# Patient Record
Sex: MALE | Race: WHITE | NOT HISPANIC OR LATINO | Employment: OTHER | URBAN - METROPOLITAN AREA
[De-identification: names, ages, dates, MRNs, and addresses within clinical notes are randomized per-mention and may not be internally consistent; named-entity substitution may affect disease eponyms.]

---

## 2017-04-20 ENCOUNTER — TELEPHONE (OUTPATIENT)
Dept: SPINE | Facility: CLINIC | Age: 22
End: 2017-04-20

## 2017-04-20 DIAGNOSIS — M41.9 SCOLIOSIS, UNSPECIFIED SCOLIOSIS TYPE, UNSPECIFIED SPINAL REGION: Primary | ICD-10-CM

## 2017-04-24 ENCOUNTER — TELEPHONE (OUTPATIENT)
Dept: SPINE | Facility: CLINIC | Age: 22
End: 2017-04-24

## 2017-04-28 ENCOUNTER — TELEPHONE (OUTPATIENT)
Dept: SPINE | Facility: CLINIC | Age: 22
End: 2017-04-28

## 2017-04-28 NOTE — TELEPHONE ENCOUNTER
"I have tried several times to reach patient to advise that his one year surgery follow up is due, but one phone number is disconnected and the other continuously says "the person you are trying to reach is not accepting cals at this time. I was able to find a family members number and asked that they please ask the patient to gave us a call, they took down the number but the call was not returned.  "

## 2017-05-15 ENCOUNTER — TELEPHONE (OUTPATIENT)
Dept: SPINE | Facility: CLINIC | Age: 22
End: 2017-05-15

## 2017-05-15 DIAGNOSIS — Z98.1 S/P SPINAL FUSION: Primary | ICD-10-CM

## 2017-05-16 ENCOUNTER — TELEPHONE (OUTPATIENT)
Dept: ORTHOPEDICS | Facility: CLINIC | Age: 22
End: 2017-05-16

## 2017-05-16 NOTE — TELEPHONE ENCOUNTER
Attempted to reach patient to advise that Dr. Rivera has added an xray prior to the appointment time that they discussed yesterday, but the phone says that the voice mail is not set up. Will try again tomorrow to reach patient.

## 2017-05-31 ENCOUNTER — TELEPHONE (OUTPATIENT)
Dept: ORTHOPEDICS | Facility: CLINIC | Age: 22
End: 2017-05-31

## 2017-05-31 NOTE — TELEPHONE ENCOUNTER
Certified letter mailed to patient to stress how important it is that he reschedule his 1 year surgery follow up. We have many numbers for patient but have a very hard time reaching him. Dr. Rivera reached him a couple of weeks ago and they set up the May 24th visit , but the patient did not make the visit.    TRACKING NUMBER :[phone] ([facility])

## 2017-10-27 ENCOUNTER — HOSPITAL ENCOUNTER (OUTPATIENT)
Dept: RADIOLOGY | Facility: HOSPITAL | Age: 22
Discharge: HOME | End: 2017-10-27
Attending: ORTHOPAEDIC SURGERY
Payer: MEDICARE

## 2017-10-27 ENCOUNTER — OFFICE VISIT (OUTPATIENT)
Dept: ORTHOPEDICS | Facility: CLINIC | Age: 22
End: 2017-10-27
Payer: MEDICARE

## 2017-10-27 VITALS — HEIGHT: 72 IN | BODY MASS INDEX: 27.09 KG/M2 | WEIGHT: 200 LBS

## 2017-10-27 DIAGNOSIS — M25.551 RIGHT HIP PAIN: ICD-10-CM

## 2017-10-27 DIAGNOSIS — R52 PAIN: Primary | ICD-10-CM

## 2017-10-27 DIAGNOSIS — G25.89 SCAPULAR DYSKINESIS: ICD-10-CM

## 2017-10-27 DIAGNOSIS — R52 PAIN: ICD-10-CM

## 2017-10-27 DIAGNOSIS — M41.40 NEUROMUSCULAR SCOLIOSIS, UNSPECIFIED SPINAL REGION: Primary | ICD-10-CM

## 2017-10-27 DIAGNOSIS — M41.9 SCOLIOSIS, UNSPECIFIED SCOLIOSIS TYPE, UNSPECIFIED SPINAL REGION: ICD-10-CM

## 2017-10-27 DIAGNOSIS — M41.9 SCOLIOSIS, UNSPECIFIED SCOLIOSIS TYPE, UNSPECIFIED SPINAL REGION: Primary | ICD-10-CM

## 2017-10-27 PROCEDURE — 99999 PR PBB SHADOW E&M-EST. PATIENT-LVL III: CPT | Mod: PBBFAC,,, | Performed by: ORTHOPAEDIC SURGERY

## 2017-10-27 PROCEDURE — 72082 X-RAY EXAM ENTIRE SPI 2/3 VW: CPT | Mod: 26,,, | Performed by: RADIOLOGY

## 2017-10-27 PROCEDURE — 73590 X-RAY EXAM OF LOWER LEG: CPT | Mod: 26,59,LT, | Performed by: RADIOLOGY

## 2017-10-27 PROCEDURE — 73590 X-RAY EXAM OF LOWER LEG: CPT | Mod: 50,TC,PO

## 2017-10-27 PROCEDURE — 73030 X-RAY EXAM OF SHOULDER: CPT | Mod: 26,LT,, | Performed by: RADIOLOGY

## 2017-10-27 PROCEDURE — 99213 OFFICE O/P EST LOW 20 MIN: CPT | Mod: PBBFAC,25,PO | Performed by: ORTHOPAEDIC SURGERY

## 2017-10-27 PROCEDURE — 73030 X-RAY EXAM OF SHOULDER: CPT | Mod: TC,PO,LT

## 2017-10-27 PROCEDURE — 72082 X-RAY EXAM ENTIRE SPI 2/3 VW: CPT | Mod: TC,PO

## 2017-10-27 PROCEDURE — 99214 OFFICE O/P EST MOD 30 MIN: CPT | Mod: S$PBB,,, | Performed by: ORTHOPAEDIC SURGERY

## 2017-10-27 PROCEDURE — 73590 X-RAY EXAM OF LOWER LEG: CPT | Mod: 26,RT,, | Performed by: RADIOLOGY

## 2017-10-27 NOTE — PROGRESS NOTES
sSubjective:      Patient ID: Daren Dubon is a 22 y.o. male.    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  Pt presents today for for evaluation of R hip pain.    Patient has also had R hip pain for a few months.  Denies injury.  Feels pain when moving and when sitting on it in the wrong manner.  Feels and hear popping.  9/10 severity.  No relieving factors.  No injury or obvious precipitating factors.    Review of patient's allergies indicates:  No Known Allergies    Past Medical History:   Diagnosis Date    Friedreich ataxia     Scoliosis      Past Surgical History:   Procedure Laterality Date    SCOLIOSIS REPAIR      wisdom teeth removal      age 18 years     Family History   Problem Relation Age of Onset    Hypertension Mother     No Known Problems Father     No Known Problems Brother     Heart disease Maternal Grandfather        Current Outpatient Prescriptions on File Prior to Visit   Medication Sig Dispense Refill    bismuth subsalicylate (PEPTO BISMOL) 262 mg/15 mL suspension Take by mouth as needed. Acid reflux      docusate sodium (COLACE) 50 MG capsule Take 2 capsules (100 mg total) by mouth 2 (two) times daily as needed. 30 capsule 0    hydrocodone-acetaminophen 5-325mg (NORCO) 5-325 mg per tablet Take 1 tablet by mouth every 4 to 6 hours as needed. 40 tablet 0    meloxicam (MOBIC) 7.5 MG tablet Take 1 tablet (7.5 mg total) by mouth once daily. 30 tablet 2    ondansetron (ZOFRAN-ODT) 8 MG TbDL Take 1 tablet (8 mg total) by mouth every 8 (eight) hours as needed. 12 tablet 2    sulfamethoxazole-trimethoprim 800-160mg (BACTRIM DS) 800-160 mg Tab        No current facility-administered medications on file prior to visit.        Social History     Social History Narrative    No narrative on file       ROS:  Constitutional: negative for  fevers  Eyes: no visual changes  ENT: negative for hearing loss  Respiratory: negative for dyspnea  Cardiovascular: negative for chest pain  Gastrointestinal: negative for abdominal pain  Genitourinary: negative for dysuria  Neurological: negative for headaches  Behavioral/Psych: negative for hallucinations  Endocrine: negative for temperature intolerance        Objective:        Vitals:    10/27/17 1015   Weight: 90.7 kg (200 lb)   Height: 6' (1.829 m)     AA&O x 4.  NAD  HEENT:  NCAT, sclera nonicteric  Lungs:  Respirations are equal and unlabored.  CV:  2+ bilateral upper and lower extremity pulses.  Skin:  Intact throughout.    MSK:  RLE:   Skin intact  No deformity  No swelling  TTP around iliac crest  No TTP GT  No pain with hip ROM  SILT L2-S1  Motor function at baseline  Brisk cap refill  Warm well perfused extremities  DP palpable and equal bilaterally      X-ray pelvis reveals broken L iliac screw        Assessment:       1. Neuromuscular scoliosis, unspecified spinal region    2. Scapular dyskinesis    3. Right hip pain           Plan:         1. CT lumbar and pelvis to assess for pseudoarthrosis.  F/u in scoli clinic 11/17/17.  Given rx for new WC at [facility].

## 2017-11-14 DIAGNOSIS — Z98.890 S/P SPINAL SURGERY: Primary | ICD-10-CM

## 2017-12-15 ENCOUNTER — OFFICE VISIT (OUTPATIENT)
Dept: ORTHOPEDICS | Facility: CLINIC | Age: 22
End: 2017-12-15
Payer: MEDICARE

## 2017-12-15 VITALS — HEIGHT: 71 IN

## 2017-12-15 DIAGNOSIS — M41.45 NEUROMUSCULAR SCOLIOSIS OF THORACOLUMBAR REGION: ICD-10-CM

## 2017-12-15 DIAGNOSIS — S32.009K PSEUDOARTHROSIS OF LUMBAR SPINE: Primary | ICD-10-CM

## 2017-12-15 DIAGNOSIS — G11.11 FRIEDREICH'S ATAXIA: ICD-10-CM

## 2017-12-15 PROCEDURE — 99214 OFFICE O/P EST MOD 30 MIN: CPT | Mod: S$PBB,,, | Performed by: ORTHOPAEDIC SURGERY

## 2017-12-15 PROCEDURE — 99999 PR PBB SHADOW E&M-EST. PATIENT-LVL III: CPT | Mod: PBBFAC,,,

## 2017-12-15 PROCEDURE — 99213 OFFICE O/P EST LOW 20 MIN: CPT | Mod: PBBFAC

## 2017-12-15 RX ORDER — TRAMADOL HYDROCHLORIDE 50 MG/1
50 TABLET ORAL NIGHTLY
COMMUNITY
End: 2017-12-15 | Stop reason: SDUPTHER

## 2017-12-15 RX ORDER — SODIUM CHLORIDE 9 MG/ML
INJECTION, SOLUTION INTRAVENOUS CONTINUOUS
Status: CANCELLED | OUTPATIENT
Start: 2017-12-15

## 2017-12-15 RX ORDER — MUPIROCIN 20 MG/G
OINTMENT TOPICAL
Status: CANCELLED | OUTPATIENT
Start: 2017-12-15

## 2017-12-15 RX ORDER — TRAMADOL HYDROCHLORIDE 50 MG/1
50 TABLET ORAL NIGHTLY
Qty: 48 TABLET | Refills: 0 | Status: SHIPPED | OUTPATIENT
Start: 2017-12-15 | End: 2018-02-01

## 2017-12-15 NOTE — PROGRESS NOTES
sSubjective:      Patient ID: Daren Dubon is a 22 y.o. male.    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  Pt presents today for for evaluation of R hip pain.    Patient has also had R hip pain for a few months.  Denies injury.  Feels pain when moving and when sitting on it in the wrong manner.  Feels and hears popping.  9/10 severity.  No relieving factors.  No injury or obvious precipitating factors.  Also occasional left hip pain and low back pain at night.    Review of patient's allergies indicates:  No Known Allergies    Past Medical History:   Diagnosis Date    Friedreich ataxia     Scoliosis      Past Surgical History:   Procedure Laterality Date    SCOLIOSIS REPAIR      wisdom teeth removal      age 18 years     Family History   Problem Relation Age of Onset    Hypertension Mother     No Known Problems Father     No Known Problems Brother     Heart disease Maternal Grandfather        Current Outpatient Prescriptions on File Prior to Visit   Medication Sig Dispense Refill    [DISCONTINUED] bismuth subsalicylate (PEPTO BISMOL) 262 mg/15 mL suspension Take by mouth as needed. Acid reflux      [DISCONTINUED] docusate sodium (COLACE) 50 MG capsule Take 2 capsules (100 mg total) by mouth 2 (two) times daily as needed. 30 capsule 0    [DISCONTINUED] hydrocodone-acetaminophen 5-325mg (NORCO) 5-325 mg per tablet Take 1 tablet by mouth every 4 to 6 hours as needed. 40 tablet 0    [DISCONTINUED] meloxicam (MOBIC) 7.5 MG tablet Take 1 tablet (7.5 mg total) by mouth once daily. 30 tablet 2    [DISCONTINUED] ondansetron (ZOFRAN-ODT) 8 MG TbDL Take 1 tablet (8 mg total) by mouth every 8 (eight) hours as needed. 12 tablet 2    [DISCONTINUED] sulfamethoxazole-trimethoprim 800-160mg (BACTRIM DS) 800-160 mg Tab        No current facility-administered medications on file prior to visit.   "      Social History     Social History Narrative    No narrative on file       ROS:  Constitutional: negative for fevers  Eyes: no visual changes  ENT: negative for hearing loss  Respiratory: negative for dyspnea  Cardiovascular: negative for chest pain  Gastrointestinal: negative for abdominal pain  Genitourinary: negative for dysuria  Neurological: negative for headaches  Behavioral/Psych: negative for hallucinations  Endocrine: negative for temperature intolerance        Objective:        Vitals:    12/15/17 0945   Height: 5' 11" (1.803 m)     AA&O x 4.  NAD  HEENT:  NCAT, sclera nonicteric  Lungs:  Respirations are equal and unlabored.  CV:  2+ bilateral upper and lower extremity pulses.  Skin:  Intact throughout.    MSK:  RLE:   Skin intact  No deformity  No swelling  TTP around iliac crest  No TTP GT  No pain with hip ROM  SILT L2-S1  Motor function at baseline  Brisk cap refill  Warm well perfused extremities  DP palpable and equal bilaterally      X-ray pelvis reveals broken L iliac screw  CT scan shows minimal healing of PSF.      Assessment:       1. Pseudoarthrosis of lumbar spine           Plan:         Recommend revision PSF with TLIF.  Counseled patient to quit smoking.  Will return for pre-op.  Surgery scheduled for 2/1.    "

## 2018-01-03 ENCOUNTER — ANESTHESIA EVENT (OUTPATIENT)
Dept: SURGERY | Facility: HOSPITAL | Age: 23
DRG: 457 | End: 2018-01-03
Payer: MEDICARE

## 2018-01-03 DIAGNOSIS — M79.606 PAIN OF LOWER EXTREMITY, UNSPECIFIED LATERALITY: Primary | ICD-10-CM

## 2018-01-03 NOTE — ANESTHESIA PREPROCEDURE EVALUATION
Anesthesia Assessment: Preoperative EQUATION    Planned Procedure: Procedure(s) (LRB):  FUSION-SPINAL L1-Pelvis Revision Co Case with Dr. Solo Ball Screws + T-PAL SNS: SSEP/EMG **AIRO Case** (N/A)  Requested Anesthesia Type:General  Surgeon: Mart Rivera MD  Service: Neurosurgery  Known or anticipated Date of Surgery:2/1/2018            Optimization:  Anesthesia Preop Clinic Assessment  Indicated    Medical Opinion Indicated DR ALANIZ      Plan:    Testing:  Hematology Profile, BMP, PT/INR, PTT, T&S and UA   Pre-anesthesia  visit       Visit focus: position other than supine     Consultation:IM Perioperative Hospitalist      Navigation: Tests Scheduled.              Consults scheduled.             Results will be tracked by Preop Clinic.                  LARRY COLON 1/3/18                                                                                                  01/03/2018  Chance WOJCIECH Dubon is a 22 y.o., male.    Anesthesia Evaluation    I have reviewed the Patient Summary Reports.    I have reviewed the Nursing Notes.   I have reviewed the Medications.     Review of Systems  Anesthesia Hx:  No problems with previous Anesthesia Denies Hx of Anesthetic complications  History of prior surgery of interest to airway management or planning: Previous anesthesia: General T4-pelvis posterior spinal fusion 4/12/16 with general anesthesia.  Procedure performed at an [facility]. Denies Family Hx of Anesthesia complications.   Denies Personal Hx of Anesthesia complications.   Social:  Former Smoker, No Alcohol Use Quit when he found out he had to have surgery; 1 ppd x 5 yrs Denies Alcohol Use.   Hematology/Oncology:  Hematology Normal   Oncology Normal     Cardiovascular:  Cardiovascular Normal Exercise tolerance: poor  Denies MI.   Wheelchair bound x 10 years. Denies CP, SOB Cardiomyopathy    Pulmonary:   Recent URI (cough last week), resolved Denies Sleep Apnea.    Renal/:  Renal/ Normal      Hepatic/GI:  Hepatic/GI Normal GERD, well controlled    Musculoskeletal:   Friedreich's ataxia - neuromuscular scoliosis Musculoskeletal General/Symptoms: muscle weakness, generalized. Functional capacity is wheelchair dependant.  Congenital/Developmental Disorder: Dx with Freidreich's  ataxia at age 10.   Spine Disorders: Scoliosis    Neurological:  Neurology Normal  Denies CVA. Denies Seizures.  Pain , onset is chronic , location of left hip , quality of aching/dull, burning, sharp , severity is a 10 , precipitating factors are movement    Endocrine:  Endocrine Normal    Dermatological:  Skin Normal    Psych:  Psychiatric Normal           Physical Exam  General:  Obesity    Airway/Jaw/Neck:  Airway Findings: Mouth Opening: Small, but > 3cm Tongue: Normal  General Airway Assessment: Adult  Jaw/Neck Findings:     Neck ROM: Normal ROM      Dental:  Dental Findings: In tact        Mental Status:  Mental Status Findings:  Cooperative, Alert and Oriented       Pt was seen in POC 1/22/18; Medical optimization: please see EPIC notes for recommendations of pre-op medical consultant, Dr Campbell, for perioperative medical management. Pt scheduled for ECHO 1/23/18./Ivette Mora RN            Anesthesia Plan  Type of Anesthesia, risks & benefits discussed:  Anesthesia Type:  MAC, general  Patient's Preference:   Intra-op Monitoring Plan:   Intra-op Monitoring Plan Comments:   Post Op Pain Control Plan: intrathecal opioid  Post Op Pain Control Plan Comments:   Induction:   IV  Beta Blocker:  Patient is not currently on a Beta-Blocker (No further documentation required).       Informed Consent: Patient understands risks and agrees with Anesthesia plan.  Questions answered. Anesthesia consent signed with patient representative.  ASA Score: 3     Day of Surgery Review of History & Physical:    H&P update referred to the surgeon.         Ready For Surgery From Anesthesia Perspective.

## 2018-01-03 NOTE — PRE ADMISSION SCREENING
Anesthesia Assessment: Preoperative EQUATION    Planned Procedure: Procedure(s) (LRB):  FUSION-SPINAL L1-Pelvis Revision Co Case with Dr. Solo Ball Screws + T-PAL SNS: SSEP/EMG **AIRO Case** (N/A)  Requested Anesthesia Type:General  Surgeon: Mart Rivera MD  Service: Neurosurgery  Known or anticipated Date of Surgery:2/1/2018            Optimization:  Anesthesia Preop Clinic Assessment  Indicated    Medical Opinion Indicated DR ALANIZ      Plan:    Testing:  Hematology Profile, BMP, PT/INR, PTT, T&S and UA   Pre-anesthesia  visit       Visit focus: position other than supine     Consultation:IM Perioperative Hospitalist      Navigation: Tests Scheduled.              Consults scheduled.             Results will be tracked by Preop Clinic.

## 2018-01-22 ENCOUNTER — HOSPITAL ENCOUNTER (OUTPATIENT)
Dept: PREADMISSION TESTING | Facility: HOSPITAL | Age: 23
Discharge: HOME | End: 2018-01-22
Attending: ANESTHESIOLOGY
Payer: MEDICARE

## 2018-01-22 ENCOUNTER — INITIAL CONSULT (OUTPATIENT)
Dept: INTERNAL MEDICINE | Facility: CLINIC | Age: 23
End: 2018-01-22
Payer: MEDICARE

## 2018-01-22 VITALS
TEMPERATURE: 100 F | DIASTOLIC BLOOD PRESSURE: 74 MMHG | SYSTOLIC BLOOD PRESSURE: 111 MMHG | OXYGEN SATURATION: 97 % | HEIGHT: 72 IN | HEART RATE: 83 BPM

## 2018-01-22 DIAGNOSIS — Z00.6 RESEARCH SUBJECT: Primary | ICD-10-CM

## 2018-01-22 DIAGNOSIS — R50.9 LOW GRADE FEVER: ICD-10-CM

## 2018-01-22 DIAGNOSIS — I51.89 DIASTOLIC DYSFUNCTION: ICD-10-CM

## 2018-01-22 DIAGNOSIS — R71.8 RBC MICROCYTOSIS: ICD-10-CM

## 2018-01-22 DIAGNOSIS — I42.9 CARDIOMYOPATHY, UNSPECIFIED TYPE: ICD-10-CM

## 2018-01-22 DIAGNOSIS — I27.20 PULMONARY HYPERTENSION: ICD-10-CM

## 2018-01-22 DIAGNOSIS — R94.31 ABNORMAL EKG: ICD-10-CM

## 2018-01-22 DIAGNOSIS — E66.9 CLASS 1 OBESITY WITH BODY MASS INDEX (BMI) OF 31.0 TO 31.9 IN ADULT, UNSPECIFIED OBESITY TYPE, UNSPECIFIED WHETHER SERIOUS COMORBIDITY PRESENT: ICD-10-CM

## 2018-01-22 DIAGNOSIS — L81.8 TATTOOS: ICD-10-CM

## 2018-01-22 DIAGNOSIS — R73.9 HYPERGLYCEMIA: ICD-10-CM

## 2018-01-22 PROCEDURE — 99213 OFFICE O/P EST LOW 20 MIN: CPT | Mod: PBBFAC,25 | Performed by: HOSPITALIST

## 2018-01-22 PROCEDURE — 99999 PR PBB SHADOW E&M-EST. PATIENT-LVL III: CPT | Mod: PBBFAC,,, | Performed by: HOSPITALIST

## 2018-01-22 PROCEDURE — 99214 OFFICE O/P EST MOD 30 MIN: CPT | Mod: S$PBB,,, | Performed by: HOSPITALIST

## 2018-01-22 NOTE — DISCHARGE INSTRUCTIONS
Your surgery has been scheduled for:__________________________________________    You should report to:  ____[facility], located on the [place] side of the first floor of the           [facility] ([phone])  ____[facility], located on the [facility] side of the            Second floor of the [facility] ([phone])  ____3rd Floor [facility] located on the [facility] side of the [facility] [phone]  Please Note   - Tell your doctor if you take Aspirin, products containing Aspirin, herbal medications  or blood thinners, such as Coumadin, Ticlid, or Plavix.  (Consult your provider regarding holding or stopping before surgery).  - Arrange for someone to drive you home following surgery.  You will not be allowed to leave the surgical facility alone or drive yourself home following sedation and anesthesia.  Before Surgery  - Stop taking all herbal medications 14days prior to surgery  - No Motrin/Advil (Ibuprofen) 7 days before surgery  - No Aleve (Naproxen) 7 days before surgery  - Stop Taking Asprin, products containing Asprin _____days before surgery  - Stop taking blood thinners_______days before surgery  - Refrain from drinking alcoholic beverages for 24hours before and after surgery  - Stop or limit smoking _________days before surgery  Night before Surgery  - DO NOT EAT OR DRINK ANYTHING AFTER MIDNIGHT, INCLUDING GUM, HARD CANDY, MINTS, OR CHEWING TOBACCO.  - Take a shower or bath (shower is recommended).  Bathe with Hibiclens soap or an antibacterial soap from the neck down.  If not supplied by your surgeon, hibiclens soap will need to be purchased over the counter in pharmacy.  Rinse soap off thoroughly.  - Shampoo your hair with your regular shampoo  The Day of Surgery  - Take another bath or shower with hibiclens or any antibacterial soap, to reduce the chance of infection.  - Take heart and blood  pressure medications with a small sip of water, as advised by the perioperative team.  - Do not take fluid pills  - You may brush your teeth and rinse your mouth, but do not swall any additional water.   - Do not apply perfumes, powder, body lotions or deodorant on the day of surgery.  - Nail polish should be removed.  - Do not wear makeup or moisturizer  - Wear comfortable clothes, such as a button front shirt and loose fitting pants.  - Leave all jewelry, including body piercings, and valuables at home.    - Bring any devices you will neeed after surgery such as crutches or canes.  - If you have sleep apnea, please bring your CPAP machine  In the event that your physical condition changes including the onset of a cold or respiratory illness, or if you have to delay or cancel your surgery, please notify your surgeon.Anesthesia: General Anesthesia  Youre due to have surgery. During surgery, youll be given medication called anesthesia. (It is also called anesthetic.) This will keep you comfortable and pain-free. Your anesthesia provider will use general anesthesia. This sheet tells you more about it.  What is general anesthesia?     You are watched continuously during your procedure by the anesthesia provider   General anesthesia puts you into a state like deep sleep. It goes into the bloodstream (IV anesthetics), into the lungs (gas anesthetics), or both. You feel nothing during the procedure. You will not remember it. During the procedure, the anesthesia provider monitors you continuously. He or she checks your heart rate and rhythm, blood pressure, breathing, and blood oxygen.  · IV Anesthetics. IV anesthetics are given through an IV line in your arm. Theyre often given first. This is so you are asleep before a gas anesthetic is started. Some kinds of IV anesthetics relieve pain. Others relax you. Your doctor will decide which kind is best in your case.  · Gas Anesthetics. Gas anesthetics are breathed into the  lungs. They are often used to keep you asleep. They can be given through a facemask or a tube placed in your larynx or trachea (breathing tube).  ? If you have a facemask, your anesthesia provider will most likely place it over your nose and mouth while youre still awake. Youll breathe oxygen through the mask as your IV anesthetic is started. Gas anesthetic may be added through the mask.  ? If you have a tube in the larynx or trachea, it will be inserted into your throat after youre asleep.  Anesthesia tools and medications  You will likely have:  · IV anesthetics. These are put into an IV line into your bloodstream.  · Gas anesthetics. You breathe these anesthetics into your lungs, where they pass into your bloodstream.  · Pulse oximeter. This is a small clip that is attached to the end of your finger. This measures your blood oxygen level.  · Electrocardiography leads (electrodes). These are small sticky pads that are placed on your chest. They record your heart rate and rhythm.  · Blood pressure cuff. This reads your blood pressure.  Risks and possible complications  General anesthesia has some risks. These include:  · Breathing problems  · Nausea and vomiting  · Sore throat or hoarseness (usually temporary)  · Allergic reaction to the anesthetic  · Irregular heartbeat (rare)  · Cardiac arrest (rare)   Anesthesia safety  · Follow all instructions you are given for how long not to eat or drink before your procedure.  · Be sure your doctor knows what medications and drugs you take. This includes over-the-counter medications, herbs, supplements, alcohol or other drugs. You will be asked when those were last taken.  · Have an adult family member or friend drive you home after the procedure.  · For the first 24 hours after your surgery:  ? Do not drive or use heavy equipment.  ? Have a trusted family member or spouse make important decisions or sign documents.  ? Avoid alcohol.  ? Have a responsible adult stay with  you. He or she can watch for problems and help keep you safe.  Date Last Reviewed: 10/16/2014  © 1440-9266 [employer]. [address] [place] [address], [place] [address]. All rights reserved. This information is not intended as a substitute for professional medical care. Always follow your healthcare professional's instructions.

## 2018-01-22 NOTE — LETTER
January 22, 2018      Rhonda G Leopold, MD  [address]  [place] [address]           [facility] - Pre Op Consult  [address] [facility] [address]  Phone: [phone]          Patient: Daren Dubon   MR Number: [id number]   YOB: 1995   Date of Visit: 1/22/2018       Dear Dr. Rhonda G Leopold:    Thank you for referring Daren Dubon to me for evaluation. Attached you will find relevant portions of my assessment and plan of care.    If you have questions, please do not hesitate to call me. I look forward to following Daren Dubon along with you.    Sincerely,    Theresa Campbell MD    Enclosure  CC:  Mart Rivera MD    If you would like to receive this communication electronically, please contact [email or web address] or [phone] to request more information on [employer] Video Link access.    For providers and/or their staff who would like to refer a patient to [facility], please contact us through our one-stop-shop provider referral line, [facility], at [phone].    If you feel you have received this communication in error or would no longer like to receive these types of communications, please e-mail [email or web address]

## 2018-01-22 NOTE — PROGRESS NOTES
[facility] - Pre Op Consult  Progress Note    Patient Name: Daren Dubon  MRN: [id number]  Date of Evaluation- 01/22/2018  PCP- Luis Calderón MD    Future cases for Daren Dubon [[id number]]     Case ID Status Date Time João Procedure Provider Location    [id number] [facility] 2/1/2018  7:00  FUSION-SPINAL L1-Pelvis Revision Co Case with Dr. Solo Jonesuy Screws + T-PAL SNS: SSEP/EMG **AIRO Case** Mart Rivera MD [[id number]] [facility] OR 2ND FLR          HPI:  History of present illness- I had the pleasure of meeting this pleasant 22 y.o. gentleman in the pre op clinic prior to his elective spine surgery. The patient is new to me . Daren JEFFREY was accompanied by mother Patricia, Step father Jerardo.    I have obtained the history by speaking to the patient and by reviewing the electronic health records.    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  Pt presents today for for evaluation of R hip pain   X-ray pelvis reveals broken L iliac screw   CT scan shows minimal healing of PSF   revision PSF with TLIF    He has been troubled with  severe  Rt hip  pain for pain. Pain increases with sitting for long time and activity and decreases with Tramadol.  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     Had a history of chest pain , nonce since his back was operated   Used to slump towards the ;left side    Had a stress test about 10  years ago- to the mother's understanding - un remarkable        Had a history of  enlarged heart?, not confirmed   Had cardiac check at [facility] end 2015 and was on  yearly checks   No heart failure history   His echo from 2008 reportedly showed mild concentric LVH with no LVOT obstruction   Possible mild diastolic dysfunction   Excellent systolic function   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   As per the record it can be associated with hypertrophic cardiomyopathy       Subjective/ Objective:          Chief complaint-Preoperative evaluation, Perioperative Medical management, complication reduction plan     Active cardiac conditions- none    Revised cardiac risk index predictors- none    Functional capacity -Examples of physical activity, confined to wheel chair, has strong upper body,  Cam move furniture with upper arms----- He can undertake all the above activities without  chest pain,chest tightness, Shortness of breath ,dizziness,lightheadedness making his exercise tolerance more, than 4 Mets.       Review of Systems   Constitutional: Negative for chills and fever.        Weight changes    Gained 30-40 pounds since 2016   Eating better since surgery   HENT:        MALINI 2/8    Neck size over 40 CM  Male gender   Eyes:        No unusual vision changes  Wears glasses   Respiratory:          Dry ,Cough   No hemoptysis   Cardiovascular:        As noted   Gastrointestinal:        Bowels- Regular  No overt GI/ blood losses   Endocrine:        Recent steroid use- none   Genitourinary: Negative for dysuria.        No urinary hesitancy   Musculoskeletal:        As above      Skin: Negative for rash.   Neurological: Negative for syncope.        No unilateral weakness   Hematological:        Current use of Anticoagulants/ Antiplatelet agent  None   Psychiatric/Behavioral:        Depression  Anxiety    None     No vascular stenting     No past medical history pertinent  negatives.    Past Surgical History:   Procedure Laterality Date    SCOLIOSIS REPAIR      wisdom teeth removal      age 18 years       No anesthesia, bleeding, cardiac problems , PONVwith previous surgeries/procedures.  Medications and Allergies reviewed in epic.   FH- No anesthesia, bleeding   in family   Maternal grand mother WPW  Lives with mother , step dad    Physical Exam   HENT:   Head: Normocephalic.       Physical Exam   HENT:   Head: Normocephalic.     Constitutional- Vitals - There is no height or weight on file to calculate BMI.,   Vitals:    01/22/18 1249   BP: 111/74   Pulse: 83   Temp: 99.8 °F (37.7 °C)     General appearance-Conscious,Coherent  Eyes- No conjunctival icterus,pupils  round  and reactive to light   ENT-Oral cavity- moist  , Hearing grossly normal   Neck- No thyromegaly ,Trachea -central, No jugular venous distension,   No Carotid Bruit   Cardiovascular -Heart Sounds- Normal  and  no murmur   , No gallop rhythm   Respiratory - Normal Respiratory Effort, Normal breath sounds,  no wheeze  and  no forced expiratory wheeze    Peripheral pitting pedal edema-- none , no calf pain   Gastrointestinal -Soft abdomen, No palpable masses, Non Tender,Liver,Spleen not palpable. No-- free fluid and shifting dullness  Musculoskeletal- No finger Clubbing. Strength 3/5 lower extremities   Lymphatic-No Palpable cervical, axillary,Inguinal lymphadenopathy   Psychiatric - normal effect,Orientation  Rt Dorsalis pedis pulses-palpable    Lt Dorsalis pedis pulses- palpable   Rt Posterior tibial pulses -palpable   Left posterior tibial pulses -palpable   Miscellaneous - tattoos,  no asterixis,  no dupuytren's contracture and  no renal bruit  Blood pressure 111/74, pulse 83, temperature 99.8 °F (37.7 °C), temperature source Oral, height 6' (1.829 m), SpO2 97 %.      Investigations  Lab and Imaging have been reviewed in [facility].    Review of Medicine tests    EKG- I had independently reviewed the EKG from--4/23/2016    It was reported to be showing     Sinus tachycardia  Possible RVH  Abnormal Q wave in lead III  T wave inversion in inferior leads  When compared with ECG of 20-APR-2016 12:42,  Vent. rate has increased BY  40 BPM  T wave inversion no longer evident in Lateral leads    2016       1 - Concentric remodeling.     2 - Normal left ventricular systolic function (EF 60-65%).     3 - Left ventricular diastolic dysfunction.     4 - The estimated PA systolic pressure is 40 mmHg.     5 - Mild to moderate pulmonic regurgitation.     6 - Normal right ventricular systolic function .     7 - Trivial tricuspid regurgitation.   Review of clinical lab tests-Date---1/22/2018  Creatinine-0.9   Date--1/22/2018 Hemoglobin--N  Platelet count--N      Review of old records- Was done and information gathered regards to events leading to surgery and health conditions of significance in the perioperative period.        Preoperative cardiac risk assessment-  The patient does not have any active cardiac conditions . Revised cardiac risk index predictors-0 ---.Functional capacity is more than 4 Mets. He will be undergoing a Spine procedure that carries a intermediate risk     The estimated risk of the rate of adverse cardiac outcomes  0.4%    No further cardiac work up is indicated prior to proceeding with the surgery     Orders Placed This Encounter    Hemoglobin A1c       American Society of Anesthesiologists Physical status classification ( ASA ) class: 3     Postoperative pulmonary complication risk assessment:        ARISCAT ( Canet) risk index- risk class -  Low    ACS NSQUIP risk - Serous complication risk - 4.5 % ( average risk is 2.9 %) , any complication 4.8 % ( Average is 3.1 %)       Assessment/Plan:     Abnormal EKG  No suggestion of of symptomatic CAD although reduced functional capacity  Confined to wheel chair   No longer having chest discomfort since spine surgery   2016- Global left ventricular systolic function appears  normal    Cardiomyopathy  No suggestion of Cardiac decompensation    Diastolic dysfunction  Diastolic Dysfunction:  I  suggest watching his fluid status perioperatively and to watch out for fluid overload in view of his Diastolic Dysfunction.  I suggest avoidance of the ordering of continuous IV fluids and if IV fluids are required ,to order for a specific duration of time and consider ordering lower IV fluid rate     RBC microcytosis  Normal Hb,HCT   No long standing NSAID use   Suggested follow up   No overt blood losses , Ulcer disease   No family history of thalassemia   Suggested follow up     Hyperglycemia  Mother reports increased thirst , Drinking water   will check A1c     Class 1 obesity with body mass index (BMI) of 31.0 to 31.9 in adult  Suggested weight loss    Pulmonary hypertension  2016- The estimated PA systolic pressure is 40 mmHg.    Low grade fever  will check urinalysis    Tattoos  No suggestion of liver decompensation          Preventive perioperative care    Thromboembolic prophylaxis:  His risk factors for thrombosis include obesity, surgical procedure and reduced mobility.I suggest  thromboembolic prophylaxis ( mechanical/pharmacological, weighing the risk benefits of pharmacological agent use considering mariajose procedural bleeding )  during the perioperative period.I suggested being active in the post operative period.      Postoperative pulmonary complication prophylaxis-Risk factors for post operative pulmonary complications include surgery lasting over 3 hours, ASA class >2 and functional dependence- I suggest incentive spirometry use, early ambulation, end tidal carbon dioxide monitoring and pain control so as to avoid diaphragmatic splinting  , oral care , head end of  Bed elevationm     Renal complication prophylaxis- I suggest keeping him well hydrated .I suggested drinking 2 litre's of water a day      Surgical site Infection Prophylaxis-I  suggest appropriate antibiotic for  Prophylaxis against Surgical site infections     In view of Spine procedure the patient  is at risk of postoperative urinary retention.  I suggest avoidance / minimizing the of  Benzodiazepines,Anticholinergic medication,antihistamines ( Benadryl) , if possible in the perioperative period. I suggest using the minimum possible use of opioids for the minimum period of time in the perioperative period. Benadryl avoidance suggested      This visit was focused on Preoperative evaluation, Perioperative Medical management, complication reduction plans. I suggest that the patient follows up with primary care or relevant sub specialists for ongoing health care.    I appreciate the opportunity to be involved in this patients care. Please feel free to contact me if there were any questions about this consultation.    Patient is pending optimization- A1c UA    Patient was instructed to call and update me about any changes to health, changes to medication, office visits , hospitalizations between now and surgery     Theresa Campbell MD  Perioperative Medicine  [facility]   Pager [phone]  -------    1/22- 18 36     Urinalysis not suggestive of UTI  ------    1/25- 7 58     A1c 5.5     Echo showed       1 - Normal left ventricular systolic function (EF 60-65%).     2 - Normal left ventricular diastolic function.     3 - Normal right ventricular systolic function .     4 - The estimated PA systolic pressure is 5 mmHg.     Echo showed no suggestion of Cardiomyopathy or Pulmonary HTN  Biventricular systolic function is normal   -----  1/26- 8 30     Called to discuss Echo,A1c   Spoke to mother   Suggested follow up about RBC Microcytosis- not on NSAID at this time   Was on Naproxen- for 2 months-none now  No family history of thalassemia, personal history of low Iron   Call, if needed

## 2018-01-22 NOTE — OUTPATIENT SUBJECTIVE & OBJECTIVE
Outpatient Subjective & Objective     Chief complaint-Preoperative evaluation, Perioperative Medical management, complication reduction plan     Active cardiac conditions- none    Revised cardiac risk index predictors- none    Functional capacity -Examples of physical activity, confined to wheel chair, has strong upper body,  Cam move furniture with upper arms----- He can undertake all the above activities without  chest pain,chest tightness, Shortness of breath ,dizziness,lightheadedness making his exercise tolerance more, than 4 Mets.       Review of Systems   Constitutional: Negative for chills and fever.        Weight changes    Gained 30-40 pounds since 2016   Eating better since surgery   HENT:        MALINI 2/8    Neck size over 40 CM  Male gender   Eyes:        No unusual vision changes  Wears glasses   Respiratory:          Dry ,Cough   No hemoptysis   Cardiovascular:        As noted   Gastrointestinal:        Bowels- Regular  No overt GI/ blood losses   Endocrine:        Recent steroid use- none   Genitourinary: Negative for dysuria.        No urinary hesitancy   Musculoskeletal:        As above      Skin: Negative for rash.   Neurological: Negative for syncope.        No unilateral weakness   Hematological:        Current use of Anticoagulants/ Antiplatelet agent  None   Psychiatric/Behavioral:        Depression  Anxiety    None     No vascular stenting     No past medical history pertinent negatives.    Past Surgical History:   Procedure Laterality Date    SCOLIOSIS REPAIR      wisdom teeth removal      age 18 years       No anesthesia, bleeding, cardiac problems , PONVwith previous surgeries/procedures.  Medications and Allergies reviewed in epic.   FH- No anesthesia, bleeding   in family   Maternal grand mother WPW  Lives with mother , step dad    Physical Exam   HENT:   Head: Normocephalic.       Physical Exam   HENT:   Head: Normocephalic.     Constitutional- Vitals - There is no height or  weight on file to calculate BMI.,   Vitals:    01/22/18 1249   BP: 111/74   Pulse: 83   Temp: 99.8 °F (37.7 °C)     General appearance-Conscious,Coherent  Eyes- No conjunctival icterus,pupils  round  and reactive to light   ENT-Oral cavity- moist  , Hearing grossly normal   Neck- No thyromegaly ,Trachea -central, No jugular venous distension,   No Carotid Bruit   Cardiovascular -Heart Sounds- Normal  and  no murmur   , No gallop rhythm   Respiratory - Normal Respiratory Effort, Normal breath sounds,  no wheeze  and  no forced expiratory wheeze    Peripheral pitting pedal edema-- none , no calf pain   Gastrointestinal -Soft abdomen, No palpable masses, Non Tender,Liver,Spleen not palpable. No-- free fluid and shifting dullness  Musculoskeletal- No finger Clubbing. Strength 3/5 lower extremities   Lymphatic-No Palpable cervical, axillary,Inguinal lymphadenopathy   Psychiatric - normal effect,Orientation  Rt Dorsalis pedis pulses-palpable    Lt Dorsalis pedis pulses- palpable   Rt Posterior tibial pulses -palpable   Left posterior tibial pulses -palpable   Miscellaneous - tattoos,  no asterixis,  no dupuytren's contracture and  no renal bruit  Blood pressure 111/74, pulse 83, temperature 99.8 °F (37.7 °C), temperature source Oral, height 6' (1.829 m), SpO2 97 %.      Investigations  Lab and Imaging have been reviewed in [facility].    Review of Medicine tests    EKG- I had independently reviewed the EKG from--4/23/2016   It was reported to be showing     Sinus tachycardia  Possible RVH  Abnormal Q wave in lead III  T wave inversion in inferior leads  When compared with ECG of 20-APR-2016 12:42,  Vent. rate has increased BY  40 BPM  T wave inversion no longer evident in Lateral leads    2016       1 - Concentric remodeling.     2 - Normal left ventricular systolic function (EF 60-65%).     3 - Left ventricular diastolic dysfunction.     4 - The estimated PA systolic pressure is 40 mmHg.     5 - Mild to moderate pulmonic  regurgitation.     6 - Normal right ventricular systolic function .     7 - Trivial tricuspid regurgitation.   Review of clinical lab tests-Date---1/22/2018  Creatinine-0.9   Date--1/22/2018 Hemoglobin--N  Platelet count--N      Review of old records- Was done and information gathered regards to events leading to surgery and health conditions of significance in the perioperative period.    Outpatient Subjective & Objective

## 2018-01-22 NOTE — ASSESSMENT & PLAN NOTE
Normal Hb,HCT   No long standing NSAID use   Suggested follow up   No overt blood losses , Ulcer disease   No family history of thalassemia   Suggested follow up

## 2018-01-22 NOTE — ASSESSMENT & PLAN NOTE
No suggestion of of symptomatic CAD although reduced functional capacity  Confined to wheel chair   No longer having chest discomfort since spine surgery   2016- Global left ventricular systolic function appears normal

## 2018-01-22 NOTE — HPI
History of present illness- I had the pleasure of meeting this pleasant 22 y.o. gentleman in the pre op clinic prior to his elective spine surgery. The patient is new to me . Daren JEFFREY was accompanied by mother Patricia, Step father Jerardo.    I have obtained the history by speaking to the patient and by reviewing the electronic health records.    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  Pt presents today for for evaluation of R hip pain   X-ray pelvis reveals broken L iliac screw   CT scan shows minimal healing of PSF   revision PSF with TLIF    He has been troubled with  severe  Rt hip  pain for pain. Pain increases with sitting for long time and activity and decreases with Tramadol.  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     Had a history of chest pain , nonce since his back was operated   Used to slump towards the ;left side   Had a stress test about 10  years ago- to the mother's understanding - un remarkable        Had a history of  enlarged heart?, not confirmed   Had cardiac check at [facility] end 2015 and was on  yearly checks   No heart failure history   His echo from 2008 reportedly showed mild concentric LVH with no LVOT obstruction   Possible mild diastolic dysfunction   Excellent systolic function   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

## 2018-01-22 NOTE — ASSESSMENT & PLAN NOTE
Diastolic Dysfunction:  I  suggest watching his fluid status perioperatively and to watch out for fluid overload in view of his Diastolic Dysfunction.  I suggest avoidance of the ordering of continuous IV fluids and if IV fluids are required ,to order for a specific duration of time and consider ordering lower IV fluid rate

## 2018-01-23 ENCOUNTER — RESEARCH ENCOUNTER (OUTPATIENT)
Dept: ORTHOPEDICS | Facility: CLINIC | Age: 23
End: 2018-01-23

## 2018-01-23 ENCOUNTER — HOSPITAL ENCOUNTER (OUTPATIENT)
Dept: CARDIOLOGY | Facility: HOSPITAL | Age: 23
Discharge: HOME | End: 2018-01-23
Attending: HOSPITALIST
Payer: MEDICARE

## 2018-01-23 VITALS
SYSTOLIC BLOOD PRESSURE: 147 MMHG | TEMPERATURE: 99 F | DIASTOLIC BLOOD PRESSURE: 89 MMHG | HEART RATE: 89 BPM | RESPIRATION RATE: 15 BRPM

## 2018-01-23 DIAGNOSIS — I42.9 CARDIOMYOPATHY, UNSPECIFIED TYPE: ICD-10-CM

## 2018-01-23 DIAGNOSIS — I27.20 PULMONARY HYPERTENSION: ICD-10-CM

## 2018-01-23 LAB
DIASTOLIC DYSFUNCTION: NO
ESTIMATED PA SYSTOLIC PRESSURE: 5.31
MITRAL VALVE MOBILITY: NORMAL
RETIRED EF AND QEF - SEE NOTES: 65 (ref 55–65)
TRICUSPID VALVE REGURGITATION: NORMAL

## 2018-01-23 PROCEDURE — 93306 TTE W/DOPPLER COMPLETE: CPT | Mod: 26,,, | Performed by: INTERNAL MEDICINE

## 2018-01-23 PROCEDURE — 93306 TTE W/DOPPLER COMPLETE: CPT

## 2018-01-23 NOTE — PROGRESS NOTES
[employer] Strive [id number]  IRB#[id number]  Subject: [id number]  PI: Martin Valadez  Visit 1    A PHASE 2b, RANDOMIZED, DOUBLE-BLIND, PLACEBO-CONTROLLED  STUDY TO EVALUATE THE SAFETY AND EFFICACY OF STAPHYLOCOCCUS  AUREUS 4-ANTIGEN VACCINE (SA4Ag) IN ADULTS UNDERGOING ELECTIVE  OPEN POSTERIOR SPINAL FUSION PROCEDURES WITH MULTILEVEL INSTRUMENTATION     Prior to the patients visit, Inclusion/Exclsuion criteria were confirmed by Rasheeda Partida and .   Research Visit occurred on January 22,2018. Encounter note created on Jan.23 by Kiko Campbell, not .       This patient was evaluated for the [employer] Strive [id number] Research Study and was found to meet all inclusion and no exclusion criteria based on the information known at the time by the ,Kiko Campbell. The patient was seen in a private area by myself and Leslee Rosado.   The informed consent was explained to the patient by , Kiko Campbell. Prior to the informed consent being signed or any protocol required testing performed, the patient was fully explained the experimental nature of the research using the simple language outlined in the informed consent. The consent is in the preferred language of the potential subject.  The informed consent document was used to explain the purpose of the study, qualifications to participate, study design, schedule, procedures, risks, benefits, alternative treatments, confidentiality, HIPAA authorization, clinicaltrials.gov, compensation and costs.  ZAYDA Ríos, was available to address any medical questions. Patient was provided the informed consent prior to this visit. I called the patient and spoke with his mother, Patricia to go over the informed consent before their study visit and answer any questions. Time was allowed for verbalizing any questions or concerns by the patient and all questions were answered to the patient's satisfaction. The patient has difficuly  speaking, so I asked open ended questions to confirm his comprehension.  His mother, Patricia acted as his Legal Authorized Representative during the visit. She also confirmed with Chance his understanding of the clinical trial. His mother had no questions and verbally confirmed she understood the clinical trial as well.The patients replies were audible and confirmed his comprehension of the study/procedures.No coercion or undue influence was used. The voluntary participation in this clinical trial and the ability to withdraw from the trial at any time was discussed multiple times during the informed consent and repeated through out the visit.     The correct version of the consent, Version Date: [id number], was signed and all required signatures were obtained. The person obtaining consent reviewed the consent for completeness. The patient verbally consented, and his Legal Authorized Representative and mother, Patricia signed the informed consent.A copy was given to the patient and his mother. A copy of the consent was placed with the subjects electronic medical records and the original was placed in the subjects binder. All study related procedures took place after the patient signed the informed consent.    Visit 1: After the consent was signed, patient demographics were recorded by the . The patient was asked to confirm their current medications. The patients smoking status and alcohol use were recorded. The patient was then asked about their medical and surgical history and the information was recorded. The patients height, weight, temperature, respiratory rate, blood pressure and pulse were taken. A urine pregnancy test was not given due to gender. Rasheeda Partida performed a physical exam and Nicolás comorbidity score. The criteria for delaying the vaccine was reviewed by Kiko Campbell. A total of 24ml of blood was drawn by phlebotomy and processed by Kiko Campbell. Nasal and throat  swabs were collected by the Kiko Campbell. After the subject met all study criteria, the subject was randomized and given the vaccine injection into the left deltoid muscle. The patient was observed for 30 minutes after the vaccine administration. The patient did not experience any post injection reactions after being observed for 30 minutes by Kiko Campbell. The patient was issued study materials and instructed on their use. The patient was reminded about emergency contacts and important signs and symptoms to monitor until surgery.      has reviewed and agreed to all of the above information.     Addendum: Patients agrees to use a condom with spermicide as method of birth control for the trial.

## 2018-01-23 NOTE — PRE-PROCEDURE INSTRUCTIONS
Pt was seen in the pre-op center today accompanied by mother. Pre-op instructions given including NPO after MN, medications to take/hold the morning of surgery, arrival procedure and location, shower with antibacterial soap; anesthesia type discussed and pre-op assessment completed. Pts questions answered and concerns addressed. Pt verbalized understanding.

## 2018-01-29 ENCOUNTER — DOCUMENTATION ONLY (OUTPATIENT)
Dept: ORTHOPEDICS | Facility: CLINIC | Age: 23
End: 2018-01-29

## 2018-01-29 NOTE — PROGRESS NOTES
Research Encounter    Nova [id number]    IRB# [id number]    Subject: [id number]    Date: 1/22/2018    Visit: 1 , Study Enrollment and Day of Vaccination    I, Rasheeda Partida, have seen the patient, Daren Dubon, today, 1/22/2018, in regards to the Pfizer [place] Tablenhan [id number] study. Before the visit today, I have reviewed the patients medical records to evaluate his preliminary eligibility and suitability for the trial in conjunction with the research coordinator, Kiko Campbell. All eligibility criteria were reviewed for final sign-off by sub-investigator, Dr. Rivera. Before the informed consent was signed, I spoke with the patient and his mother, Patricia, to answer any medical questions they had about the trial. They verbalized they had no questions and wished to proceed with the trial.    After the informed consent was signed, I performed a physical examination and [place]Minn Comorbidity Score. The patient continued with the rest of his study visit, which involved a blood draw by phlebotomy and nasal and throat swabs performed by the research coordinator. As the last part of the study visit, I administered the Investigational Vaccine to the patients left deltoid. The research coordinator then observed the patient for 30 minutes post injection and instructed them on follow up procedures for injection site monitoring.

## 2018-01-31 DIAGNOSIS — S32.009K PSEUDOARTHROSIS OF LUMBAR SPINE: Primary | ICD-10-CM

## 2018-02-01 ENCOUNTER — HOSPITAL ENCOUNTER (INPATIENT)
Facility: HOSPITAL | Age: 23
LOS: 5 days | Discharge: HOME | DRG: 457 | End: 2018-02-06
Attending: ORTHOPAEDIC SURGERY | Admitting: ORTHOPAEDIC SURGERY
Payer: MEDICARE

## 2018-02-01 ENCOUNTER — RESEARCH ENCOUNTER (OUTPATIENT)
Dept: RESEARCH | Facility: HOSPITAL | Age: 23
End: 2018-02-01

## 2018-02-01 ENCOUNTER — ANESTHESIA (OUTPATIENT)
Dept: SURGERY | Facility: HOSPITAL | Age: 23
DRG: 457 | End: 2018-02-01
Payer: MEDICARE

## 2018-02-01 DIAGNOSIS — M41.45 NEUROMUSCULAR SCOLIOSIS OF THORACOLUMBAR REGION: Primary | ICD-10-CM

## 2018-02-01 DIAGNOSIS — S32.009K PSEUDOARTHROSIS OF LUMBAR SPINE: ICD-10-CM

## 2018-02-01 DIAGNOSIS — R52 PAIN: ICD-10-CM

## 2018-02-01 LAB
ABO + RH BLD: NORMAL
ANION GAP SERPL CALC-SCNC: 12 MMOL/L
BASOPHILS # BLD AUTO: 0.02 K/UL
BASOPHILS NFR BLD: 0.2 %
BLD GP AB SCN CELLS X3 SERPL QL: NORMAL
BUN SERPL-MCNC: 11 MG/DL
CALCIUM SERPL-MCNC: 8 MG/DL
CHLORIDE SERPL-SCNC: 108 MMOL/L
CO2 SERPL-SCNC: 19 MMOL/L
CREAT SERPL-MCNC: 1 MG/DL
DIFFERENTIAL METHOD: ABNORMAL
EOSINOPHIL # BLD AUTO: 0 K/UL
EOSINOPHIL NFR BLD: 0.1 %
ERYTHROCYTE [DISTWIDTH] IN BLOOD BY AUTOMATED COUNT: 12.9 %
EST. GFR  (AFRICAN AMERICAN): >60 ML/MIN/1.73 M^2
EST. GFR  (NON AFRICAN AMERICAN): >60 ML/MIN/1.73 M^2
GLUCOSE SERPL-MCNC: 122 MG/DL (ref 70–110)
GLUCOSE SERPL-MCNC: 169 MG/DL
HCO3 UR-SCNC: 23.5 MMOL/L (ref 24–28)
HCT VFR BLD AUTO: 39.1 %
HCT VFR BLD CALC: 35 %PCV (ref 36–54)
HGB BLD-MCNC: 13.2 G/DL
IMM GRANULOCYTES # BLD AUTO: 0.13 K/UL
IMM GRANULOCYTES NFR BLD AUTO: 1.1 %
LYMPHOCYTES # BLD AUTO: 0.7 K/UL
LYMPHOCYTES NFR BLD: 5.3 %
MCH RBC QN AUTO: 26.7 PG
MCHC RBC AUTO-ENTMCNC: 33.8 G/DL
MCV RBC AUTO: 79 FL
MONOCYTES # BLD AUTO: 0.4 K/UL
MONOCYTES NFR BLD: 2.9 %
NEUTROPHILS # BLD AUTO: 11.1 K/UL
NEUTROPHILS NFR BLD: 90.4 %
NRBC BLD-RTO: 0 /100 WBC
PCO2 BLDA: 38.8 MMHG (ref 35–45)
PH SMN: 7.39 [PH] (ref 7.35–7.45)
PLATELET # BLD AUTO: 233 K/UL
PMV BLD AUTO: 10.4 FL
PO2 BLDA: 144 MMHG (ref 80–100)
POC BE: -1 MMOL/L
POC IONIZED CALCIUM: 1.06 MMOL/L (ref 1.06–1.42)
POC SATURATED O2: 99 % (ref 95–100)
POC TCO2: 25 MMOL/L (ref 23–27)
POCT GLUCOSE: 94 MG/DL (ref 70–110)
POTASSIUM BLD-SCNC: 3.9 MMOL/L (ref 3.5–5.1)
POTASSIUM SERPL-SCNC: 4.3 MMOL/L
RBC # BLD AUTO: 4.95 M/UL
SAMPLE: ABNORMAL
SODIUM BLD-SCNC: 138 MMOL/L (ref 136–145)
SODIUM SERPL-SCNC: 139 MMOL/L
WBC # BLD AUTO: 12.25 K/UL

## 2018-02-01 PROCEDURE — 22849 REINSERT SPINAL FIXATION: CPT | Mod: ,,, | Performed by: ORTHOPAEDIC SURGERY

## 2018-02-01 PROCEDURE — 27201037 HC PRESSURE MONITORING SET UP

## 2018-02-01 PROCEDURE — 22633 ARTHRD CMBN 1NTRSPC LUMBAR: CPT | Mod: 62,,, | Performed by: ORTHOPAEDIC SURGERY

## 2018-02-01 PROCEDURE — 37000009 HC ANESTHESIA EA ADD 15 MINS: Performed by: ORTHOPAEDIC SURGERY

## 2018-02-01 PROCEDURE — 36620 INSERTION CATHETER ARTERY: CPT | Mod: 59,,, | Performed by: ANESTHESIOLOGY

## 2018-02-01 PROCEDURE — 80048 BASIC METABOLIC PNL TOTAL CA: CPT

## 2018-02-01 PROCEDURE — 86920 COMPATIBILITY TEST SPIN: CPT

## 2018-02-01 PROCEDURE — 36000711: Performed by: ORTHOPAEDIC SURGERY

## 2018-02-01 PROCEDURE — 25000003 PHARM REV CODE 250: Performed by: ANESTHESIOLOGY

## 2018-02-01 PROCEDURE — 63600175 PHARM REV CODE 636 W HCPCS: Performed by: ORTHOPAEDIC SURGERY

## 2018-02-01 PROCEDURE — 25000003 PHARM REV CODE 250: Performed by: ORTHOPAEDIC SURGERY

## 2018-02-01 PROCEDURE — 0SG3071 FUSION OF LUMBOSACRAL JOINT WITH AUTOLOGOUS TISSUE SUBSTITUTE, POSTERIOR APPROACH, POSTERIOR COLUMN, OPEN APPROACH: ICD-10-PCS | Performed by: ORTHOPAEDIC SURGERY

## 2018-02-01 PROCEDURE — 25000003 PHARM REV CODE 250: Performed by: STUDENT IN AN ORGANIZED HEALTH CARE EDUCATION/TRAINING PROGRAM

## 2018-02-01 PROCEDURE — 86901 BLOOD TYPING SEROLOGIC RH(D): CPT

## 2018-02-01 PROCEDURE — 63600175 PHARM REV CODE 636 W HCPCS: Performed by: STUDENT IN AN ORGANIZED HEALTH CARE EDUCATION/TRAINING PROGRAM

## 2018-02-01 PROCEDURE — 0ST20ZZ RESECTION OF LUMBAR VERTEBRAL DISC, OPEN APPROACH: ICD-10-PCS | Performed by: ORTHOPAEDIC SURGERY

## 2018-02-01 PROCEDURE — 36000710: Performed by: ORTHOPAEDIC SURGERY

## 2018-02-01 PROCEDURE — 88300 SURGICAL PATH GROSS: CPT | Mod: 26,,, | Performed by: PATHOLOGY

## 2018-02-01 PROCEDURE — 0QP004Z REMOVAL OF INTERNAL FIXATION DEVICE FROM LUMBAR VERTEBRA, OPEN APPROACH: ICD-10-PCS | Performed by: ORTHOPAEDIC SURGERY

## 2018-02-01 PROCEDURE — 25000003 PHARM REV CODE 250: Performed by: NURSE ANESTHETIST, CERTIFIED REGISTERED

## 2018-02-01 PROCEDURE — 0QH204Z INSERTION OF INTERNAL FIXATION DEVICE INTO RIGHT PELVIC BONE, OPEN APPROACH: ICD-10-PCS | Performed by: ORTHOPAEDIC SURGERY

## 2018-02-01 PROCEDURE — C1713 ANCHOR/SCREW BN/BN,TIS/BN: HCPCS | Performed by: ORTHOPAEDIC SURGERY

## 2018-02-01 PROCEDURE — D9220A PRA ANESTHESIA: Mod: CRNA,,, | Performed by: NURSE ANESTHETIST, CERTIFIED REGISTERED

## 2018-02-01 PROCEDURE — 63600175 PHARM REV CODE 636 W HCPCS: Performed by: NURSE ANESTHETIST, CERTIFIED REGISTERED

## 2018-02-01 PROCEDURE — 85025 COMPLETE CBC W/AUTO DIFF WBC: CPT

## 2018-02-01 PROCEDURE — 0QH304Z INSERTION OF INTERNAL FIXATION DEVICE INTO LEFT PELVIC BONE, OPEN APPROACH: ICD-10-PCS | Performed by: ORTHOPAEDIC SURGERY

## 2018-02-01 PROCEDURE — 37000008 HC ANESTHESIA 1ST 15 MINUTES: Performed by: ORTHOPAEDIC SURGERY

## 2018-02-01 PROCEDURE — 22853 INSJ BIOMECHANICAL DEVICE: CPT | Mod: 59,,, | Performed by: ORTHOPAEDIC SURGERY

## 2018-02-01 PROCEDURE — 71000039 HC RECOVERY, EACH ADD'L HOUR: Performed by: ORTHOPAEDIC SURGERY

## 2018-02-01 PROCEDURE — 0SH308Z INSERTION OF SPACER INTO LUMBOSACRAL JOINT, OPEN APPROACH: ICD-10-PCS | Performed by: ORTHOPAEDIC SURGERY

## 2018-02-01 PROCEDURE — 20930 SP BONE ALGRFT MORSEL ADD-ON: CPT | Mod: ,,, | Performed by: ORTHOPAEDIC SURGERY

## 2018-02-01 PROCEDURE — 27201423 OPTIME MED/SURG SUP & DEVICES STERILE SUPPLY: Performed by: ORTHOPAEDIC SURGERY

## 2018-02-01 PROCEDURE — 27000221 HC OXYGEN, UP TO 24 HOURS

## 2018-02-01 PROCEDURE — 71000033 HC RECOVERY, INTIAL HOUR: Performed by: ORTHOPAEDIC SURGERY

## 2018-02-01 PROCEDURE — 20000000 HC ICU ROOM

## 2018-02-01 PROCEDURE — 22614 ARTHRD PST TQ 1NTRSPC EA ADD: CPT | Mod: 62,,, | Performed by: ORTHOPAEDIC SURGERY

## 2018-02-01 PROCEDURE — 27800903 OPTIME MED/SURG SUP & DEVICES OTHER IMPLANTS: Performed by: ORTHOPAEDIC SURGERY

## 2018-02-01 PROCEDURE — C9290 INJ, BUPIVACAINE LIPOSOME: HCPCS | Performed by: ORTHOPAEDIC SURGERY

## 2018-02-01 PROCEDURE — C1729 CATH, DRAINAGE: HCPCS | Performed by: ORTHOPAEDIC SURGERY

## 2018-02-01 PROCEDURE — 88300 SURGICAL PATH GROSS: CPT | Performed by: PATHOLOGY

## 2018-02-01 PROCEDURE — 0SG0071 FUSION OF LUMBAR VERTEBRAL JOINT WITH AUTOLOGOUS TISSUE SUBSTITUTE, POSTERIOR APPROACH, POSTERIOR COLUMN, OPEN APPROACH: ICD-10-PCS | Performed by: ORTHOPAEDIC SURGERY

## 2018-02-01 PROCEDURE — D9220A PRA ANESTHESIA: Mod: ANES,,, | Performed by: ANESTHESIOLOGY

## 2018-02-01 PROCEDURE — 20936 SP BONE AGRFT LOCAL ADD-ON: CPT | Mod: ,,, | Performed by: ORTHOPAEDIC SURGERY

## 2018-02-01 PROCEDURE — 63600175 PHARM REV CODE 636 W HCPCS: Performed by: ANESTHESIOLOGY

## 2018-02-01 DEVICE — ROD 5.5X480MM COCR: Type: IMPLANTABLE DEVICE | Site: BACK | Status: FUNCTIONAL

## 2018-02-01 DEVICE — SCREW INNER SINGLE SET TITANIU: Type: IMPLANTABLE DEVICE | Site: BACK | Status: FUNCTIONAL

## 2018-02-01 DEVICE — CONNECTOR SPINAL OPENSIDE 5.5: Type: IMPLANTABLE DEVICE | Site: BACK | Status: FUNCTIONAL

## 2018-02-01 DEVICE — SCREW SPINAL 5.5 8 X 80MM: Type: IMPLANTABLE DEVICE | Site: BACK | Status: FUNCTIONAL

## 2018-02-01 DEVICE — MATRIX BONE CELLR VIVIGEN 5CC: Type: IMPLANTABLE DEVICE | Site: BACK | Status: FUNCTIONAL

## 2018-02-01 DEVICE — BONE 30CC CANCELLOUS CRUSHED: Type: IMPLANTABLE DEVICE | Site: BACK | Status: FUNCTIONAL

## 2018-02-01 DEVICE — SCREW BONE SPINAL CORICAL 7X40: Type: IMPLANTABLE DEVICE | Site: BACK | Status: FUNCTIONAL

## 2018-02-01 DEVICE — SPACER T-PAL 10X28X11MM: Type: IMPLANTABLE DEVICE | Site: BACK | Status: FUNCTIONAL

## 2018-02-01 DEVICE — SCREW BONE SPINAL CORICAL 6X40: Type: IMPLANTABLE DEVICE | Site: BACK | Status: FUNCTIONAL

## 2018-02-01 RX ORDER — ONDANSETRON 2 MG/ML
INJECTION INTRAMUSCULAR; INTRAVENOUS
Status: DISCONTINUED | OUTPATIENT
Start: 2018-02-01 | End: 2018-02-01

## 2018-02-01 RX ORDER — MEPERIDINE HYDROCHLORIDE 50 MG/ML
12.5 INJECTION INTRAMUSCULAR; INTRAVENOUS; SUBCUTANEOUS ONCE AS NEEDED
Status: DISCONTINUED | OUTPATIENT
Start: 2018-02-01 | End: 2018-02-01 | Stop reason: HOSPADM

## 2018-02-01 RX ORDER — LIDOCAINE HCL/PF 100 MG/5ML
SYRINGE (ML) INTRAVENOUS
Status: DISCONTINUED | OUTPATIENT
Start: 2018-02-01 | End: 2018-02-01

## 2018-02-01 RX ORDER — OXYCODONE AND ACETAMINOPHEN 10; 325 MG/1; MG/1
1 TABLET ORAL EVERY 4 HOURS PRN
Qty: 91 TABLET | Refills: 0 | Status: SHIPPED | OUTPATIENT
Start: 2018-02-01 | End: 2018-02-23 | Stop reason: SDUPTHER

## 2018-02-01 RX ORDER — MIDAZOLAM HYDROCHLORIDE 1 MG/ML
INJECTION INTRAMUSCULAR; INTRAVENOUS
Status: DISCONTINUED | OUTPATIENT
Start: 2018-02-01 | End: 2018-02-01

## 2018-02-01 RX ORDER — PROPOFOL 10 MG/ML
VIAL (ML) INTRAVENOUS CONTINUOUS PRN
Status: DISCONTINUED | OUTPATIENT
Start: 2018-02-01 | End: 2018-02-01

## 2018-02-01 RX ORDER — METHOCARBAMOL 750 MG/1
750 TABLET, FILM COATED ORAL 3 TIMES DAILY
Qty: 42 TABLET | Refills: 0 | Status: SHIPPED | OUTPATIENT
Start: 2018-02-01 | End: 2018-02-11

## 2018-02-01 RX ORDER — ACETAMINOPHEN 10 MG/ML
INJECTION, SOLUTION INTRAVENOUS
Status: DISCONTINUED | OUTPATIENT
Start: 2018-02-01 | End: 2018-02-01

## 2018-02-01 RX ORDER — MUPIROCIN 20 MG/G
OINTMENT TOPICAL
Status: DISCONTINUED | OUTPATIENT
Start: 2018-02-01 | End: 2018-02-01

## 2018-02-01 RX ORDER — LIDOCAINE HYDROCHLORIDE AND EPINEPHRINE 10; 10 MG/ML; UG/ML
INJECTION, SOLUTION INFILTRATION; PERINEURAL
Status: DISCONTINUED | OUTPATIENT
Start: 2018-02-01 | End: 2018-02-01 | Stop reason: HOSPADM

## 2018-02-01 RX ORDER — FENTANYL CITRATE 50 UG/ML
25 INJECTION, SOLUTION INTRAMUSCULAR; INTRAVENOUS EVERY 5 MIN PRN
Status: DISCONTINUED | OUTPATIENT
Start: 2018-02-01 | End: 2018-02-01 | Stop reason: HOSPADM

## 2018-02-01 RX ORDER — FENTANYL CITRATE 50 UG/ML
INJECTION, SOLUTION INTRAMUSCULAR; INTRAVENOUS
Status: DISCONTINUED | OUTPATIENT
Start: 2018-02-01 | End: 2018-02-01

## 2018-02-01 RX ORDER — METOPROLOL TARTRATE 1 MG/ML
INJECTION, SOLUTION INTRAVENOUS
Status: COMPLETED
Start: 2018-02-01 | End: 2018-02-01

## 2018-02-01 RX ORDER — SODIUM CHLORIDE 9 MG/ML
INJECTION, SOLUTION INTRAVENOUS CONTINUOUS
Status: DISCONTINUED | OUTPATIENT
Start: 2018-02-01 | End: 2018-02-05

## 2018-02-01 RX ORDER — ROCURONIUM BROMIDE 10 MG/ML
INJECTION, SOLUTION INTRAVENOUS
Status: DISCONTINUED | OUTPATIENT
Start: 2018-02-01 | End: 2018-02-01

## 2018-02-01 RX ORDER — HEPARIN SODIUM 5000 [USP'U]/ML
5000 INJECTION, SOLUTION INTRAVENOUS; SUBCUTANEOUS EVERY 8 HOURS
Status: DISCONTINUED | OUTPATIENT
Start: 2018-02-03 | End: 2018-02-06 | Stop reason: HOSPADM

## 2018-02-01 RX ORDER — DIPHENHYDRAMINE HCL 25 MG
25 CAPSULE ORAL EVERY 6 HOURS PRN
Status: DISCONTINUED | OUTPATIENT
Start: 2018-02-01 | End: 2018-02-06 | Stop reason: HOSPADM

## 2018-02-01 RX ORDER — LABETALOL HYDROCHLORIDE 5 MG/ML
INJECTION, SOLUTION INTRAVENOUS
Status: DISCONTINUED | OUTPATIENT
Start: 2018-02-01 | End: 2018-02-01

## 2018-02-01 RX ORDER — SODIUM CHLORIDE 9 MG/ML
INJECTION, SOLUTION INTRAVENOUS CONTINUOUS
Status: DISCONTINUED | OUTPATIENT
Start: 2018-02-01 | End: 2018-02-01

## 2018-02-01 RX ORDER — ONDANSETRON 2 MG/ML
4 INJECTION INTRAMUSCULAR; INTRAVENOUS DAILY PRN
Status: DISCONTINUED | OUTPATIENT
Start: 2018-02-01 | End: 2018-02-01 | Stop reason: HOSPADM

## 2018-02-01 RX ORDER — SODIUM CHLORIDE 9 MG/ML
INJECTION, SOLUTION INTRAVENOUS CONTINUOUS PRN
Status: DISCONTINUED | OUTPATIENT
Start: 2018-02-01 | End: 2018-02-01

## 2018-02-01 RX ORDER — PHENYLEPHRINE HYDROCHLORIDE 10 MG/ML
INJECTION INTRAVENOUS
Status: DISCONTINUED | OUTPATIENT
Start: 2018-02-01 | End: 2018-02-01

## 2018-02-01 RX ORDER — CEFAZOLIN SODIUM 1 G/3ML
INJECTION, POWDER, FOR SOLUTION INTRAMUSCULAR; INTRAVENOUS
Status: DISCONTINUED | OUTPATIENT
Start: 2018-02-01 | End: 2018-02-01

## 2018-02-01 RX ORDER — MORPHINE SULFATE 0.5 MG/ML
0.5 INJECTION, SOLUTION EPIDURAL; INTRATHECAL; INTRAVENOUS ONCE
Status: COMPLETED | OUTPATIENT
Start: 2018-02-01 | End: 2018-02-01

## 2018-02-01 RX ORDER — LIDOCAINE HYDROCHLORIDE 10 MG/ML
1 INJECTION, SOLUTION EPIDURAL; INFILTRATION; INTRACAUDAL; PERINEURAL ONCE
Status: COMPLETED | OUTPATIENT
Start: 2018-02-01 | End: 2018-02-01

## 2018-02-01 RX ORDER — KETAMINE HCL IN 0.9 % NACL 50 MG/5 ML
SYRINGE (ML) INTRAVENOUS
Status: DISCONTINUED | OUTPATIENT
Start: 2018-02-01 | End: 2018-02-01

## 2018-02-01 RX ORDER — VANCOMYCIN HYDROCHLORIDE 1 G/20ML
INJECTION, POWDER, LYOPHILIZED, FOR SOLUTION INTRAVENOUS
Status: DISCONTINUED | OUTPATIENT
Start: 2018-02-01 | End: 2018-02-01 | Stop reason: HOSPADM

## 2018-02-01 RX ORDER — METHOCARBAMOL 750 MG/1
750 TABLET, FILM COATED ORAL 3 TIMES DAILY
Status: DISCONTINUED | OUTPATIENT
Start: 2018-02-01 | End: 2018-02-06 | Stop reason: HOSPADM

## 2018-02-01 RX ORDER — DEXAMETHASONE SODIUM PHOSPHATE 4 MG/ML
INJECTION, SOLUTION INTRA-ARTICULAR; INTRALESIONAL; INTRAMUSCULAR; INTRAVENOUS; SOFT TISSUE
Status: DISCONTINUED | OUTPATIENT
Start: 2018-02-01 | End: 2018-02-01

## 2018-02-01 RX ORDER — CEFAZOLIN SODIUM 1 G/3ML
2 INJECTION, POWDER, FOR SOLUTION INTRAMUSCULAR; INTRAVENOUS
Status: COMPLETED | OUTPATIENT
Start: 2018-02-01 | End: 2018-02-02

## 2018-02-01 RX ORDER — ACETAMINOPHEN 10 MG/ML
1000 INJECTION, SOLUTION INTRAVENOUS EVERY 8 HOURS
Status: DISCONTINUED | OUTPATIENT
Start: 2018-02-01 | End: 2018-02-01

## 2018-02-01 RX ORDER — DOCUSATE SODIUM 100 MG/1
100 CAPSULE, LIQUID FILLED ORAL 2 TIMES DAILY
Qty: 60 CAPSULE | Refills: 0 | Status: SHIPPED | OUTPATIENT
Start: 2018-02-01 | End: 2018-03-14

## 2018-02-01 RX ORDER — MAG HYDROX/ALUMINUM HYD/SIMETH 200-200-20
30 SUSPENSION, ORAL (FINAL DOSE FORM) ORAL EVERY 4 HOURS PRN
Status: DISCONTINUED | OUTPATIENT
Start: 2018-02-01 | End: 2018-02-06 | Stop reason: HOSPADM

## 2018-02-01 RX ORDER — ONDANSETRON 8 MG/1
8 TABLET, ORALLY DISINTEGRATING ORAL EVERY 6 HOURS PRN
Status: DISCONTINUED | OUTPATIENT
Start: 2018-02-01 | End: 2018-02-06 | Stop reason: HOSPADM

## 2018-02-01 RX ORDER — BUPIVACAINE HYDROCHLORIDE AND EPINEPHRINE 5; 5 MG/ML; UG/ML
INJECTION, SOLUTION EPIDURAL; INTRACAUDAL; PERINEURAL
Status: DISCONTINUED | OUTPATIENT
Start: 2018-02-01 | End: 2018-02-01 | Stop reason: HOSPADM

## 2018-02-01 RX ORDER — SODIUM CHLORIDE 0.9 % (FLUSH) 0.9 %
3 SYRINGE (ML) INJECTION
Status: DISCONTINUED | OUTPATIENT
Start: 2018-02-01 | End: 2018-02-01 | Stop reason: HOSPADM

## 2018-02-01 RX ORDER — METOPROLOL TARTRATE 1 MG/ML
INJECTION, SOLUTION INTRAVENOUS
Status: DISCONTINUED | OUTPATIENT
Start: 2018-02-01 | End: 2018-02-01

## 2018-02-01 RX ORDER — MUPIROCIN 20 MG/G
1 OINTMENT TOPICAL 2 TIMES DAILY
Status: DISCONTINUED | OUTPATIENT
Start: 2018-02-01 | End: 2018-02-06 | Stop reason: HOSPADM

## 2018-02-01 RX ORDER — BISACODYL 10 MG
10 SUPPOSITORY, RECTAL RECTAL DAILY
Status: DISCONTINUED | OUTPATIENT
Start: 2018-02-01 | End: 2018-02-06 | Stop reason: HOSPADM

## 2018-02-01 RX ORDER — ONDANSETRON HYDROCHLORIDE 8 MG/1
8 TABLET, FILM COATED ORAL EVERY 12 HOURS PRN
Qty: 60 TABLET | Refills: 0 | Status: SHIPPED | OUTPATIENT
Start: 2018-02-01 | End: 2018-03-14

## 2018-02-01 RX ORDER — ACETAMINOPHEN 10 MG/ML
1000 INJECTION, SOLUTION INTRAVENOUS EVERY 8 HOURS
Status: COMPLETED | OUTPATIENT
Start: 2018-02-02 | End: 2018-02-02

## 2018-02-01 RX ORDER — ONDANSETRON 2 MG/ML
4 INJECTION INTRAMUSCULAR; INTRAVENOUS EVERY 8 HOURS PRN
Status: DISCONTINUED | OUTPATIENT
Start: 2018-02-01 | End: 2018-02-06 | Stop reason: HOSPADM

## 2018-02-01 RX ORDER — ESMOLOL HYDROCHLORIDE 10 MG/ML
INJECTION INTRAVENOUS
Status: DISCONTINUED | OUTPATIENT
Start: 2018-02-01 | End: 2018-02-01

## 2018-02-01 RX ORDER — PROPOFOL 10 MG/ML
VIAL (ML) INTRAVENOUS
Status: DISCONTINUED | OUTPATIENT
Start: 2018-02-01 | End: 2018-02-01

## 2018-02-01 RX ORDER — BACITRACIN 50000 [IU]/1
INJECTION, POWDER, FOR SOLUTION INTRAMUSCULAR
Status: DISCONTINUED | OUTPATIENT
Start: 2018-02-01 | End: 2018-02-01 | Stop reason: HOSPADM

## 2018-02-01 RX ORDER — HYDRALAZINE HYDROCHLORIDE 20 MG/ML
INJECTION INTRAMUSCULAR; INTRAVENOUS
Status: DISCONTINUED | OUTPATIENT
Start: 2018-02-01 | End: 2018-02-01

## 2018-02-01 RX ORDER — AMOXICILLIN 250 MG
2 CAPSULE ORAL NIGHTLY PRN
Status: DISCONTINUED | OUTPATIENT
Start: 2018-02-01 | End: 2018-02-06 | Stop reason: HOSPADM

## 2018-02-01 RX ORDER — RAMELTEON 8 MG/1
8 TABLET ORAL NIGHTLY PRN
Status: DISCONTINUED | OUTPATIENT
Start: 2018-02-01 | End: 2018-02-06 | Stop reason: HOSPADM

## 2018-02-01 RX ADMIN — FENTANYL CITRATE 150 MCG: 50 INJECTION, SOLUTION INTRAMUSCULAR; INTRAVENOUS at 07:02

## 2018-02-01 RX ADMIN — SODIUM CHLORIDE: 0.9 INJECTION, SOLUTION INTRAVENOUS at 10:02

## 2018-02-01 RX ADMIN — HYDRALAZINE HYDROCHLORIDE 5 MG: 20 INJECTION INTRAMUSCULAR; INTRAVENOUS at 11:02

## 2018-02-01 RX ADMIN — MORPHINE SULFATE 0.5 MG: 0.5 INJECTION, SOLUTION EPIDURAL; INTRATHECAL; INTRAVENOUS at 11:02

## 2018-02-01 RX ADMIN — ROCURONIUM BROMIDE 40 MG: 10 INJECTION, SOLUTION INTRAVENOUS at 07:02

## 2018-02-01 RX ADMIN — SODIUM CHLORIDE, SODIUM GLUCONATE, SODIUM ACETATE, POTASSIUM CHLORIDE, MAGNESIUM CHLORIDE, SODIUM PHOSPHATE, DIBASIC, AND POTASSIUM PHOSPHATE: .53; .5; .37; .037; .03; .012; .00082 INJECTION, SOLUTION INTRAVENOUS at 08:02

## 2018-02-01 RX ADMIN — PROPOFOL 25 MG: 10 INJECTION, EMULSION INTRAVENOUS at 01:02

## 2018-02-01 RX ADMIN — Medication 100 MG: at 07:02

## 2018-02-01 RX ADMIN — PROPOFOL 50 MG: 10 INJECTION, EMULSION INTRAVENOUS at 12:02

## 2018-02-01 RX ADMIN — ESMOLOL HYDROCHLORIDE 10 MG: 10 INJECTION INTRAVENOUS at 01:02

## 2018-02-01 RX ADMIN — ESMOLOL HYDROCHLORIDE 10 MG: 10 INJECTION INTRAVENOUS at 12:02

## 2018-02-01 RX ADMIN — SODIUM CHLORIDE, SODIUM GLUCONATE, SODIUM ACETATE, POTASSIUM CHLORIDE, MAGNESIUM CHLORIDE, SODIUM PHOSPHATE, DIBASIC, AND POTASSIUM PHOSPHATE: .53; .5; .37; .037; .03; .012; .00082 INJECTION, SOLUTION INTRAVENOUS at 07:02

## 2018-02-01 RX ADMIN — LABETALOL HYDROCHLORIDE 2.5 MG: 5 INJECTION, SOLUTION INTRAVENOUS at 10:02

## 2018-02-01 RX ADMIN — MIDAZOLAM HYDROCHLORIDE 2 MG: 1 INJECTION, SOLUTION INTRAMUSCULAR; INTRAVENOUS at 07:02

## 2018-02-01 RX ADMIN — METOPROLOL TARTRATE 2 MG: 5 INJECTION, SOLUTION INTRAVENOUS at 01:02

## 2018-02-01 RX ADMIN — FENTANYL CITRATE 50 MCG: 50 INJECTION, SOLUTION INTRAMUSCULAR; INTRAVENOUS at 07:02

## 2018-02-01 RX ADMIN — ACETAMINOPHEN 1000 MG: 10 INJECTION, SOLUTION INTRAVENOUS at 05:02

## 2018-02-01 RX ADMIN — ONDANSETRON 4 MG: 2 INJECTION INTRAMUSCULAR; INTRAVENOUS at 11:02

## 2018-02-01 RX ADMIN — LABETALOL HYDROCHLORIDE 10 MG: 5 INJECTION, SOLUTION INTRAVENOUS at 10:02

## 2018-02-01 RX ADMIN — ONDANSETRON 4 MG: 2 INJECTION INTRAMUSCULAR; INTRAVENOUS at 05:02

## 2018-02-01 RX ADMIN — CEFAZOLIN 3 G: 330 INJECTION, POWDER, FOR SOLUTION INTRAMUSCULAR; INTRAVENOUS at 12:02

## 2018-02-01 RX ADMIN — REMIFENTANIL HYDROCHLORIDE 0.01 MCG/KG/MIN: 1 INJECTION, POWDER, LYOPHILIZED, FOR SOLUTION INTRAVENOUS at 08:02

## 2018-02-01 RX ADMIN — SODIUM CHLORIDE: 0.9 INJECTION, SOLUTION INTRAVENOUS at 03:02

## 2018-02-01 RX ADMIN — METHOCARBAMOL 750 MG: 750 TABLET ORAL at 09:02

## 2018-02-01 RX ADMIN — ROCURONIUM BROMIDE 20 MG: 10 INJECTION, SOLUTION INTRAVENOUS at 11:02

## 2018-02-01 RX ADMIN — PROPOFOL 50 MG: 10 INJECTION, EMULSION INTRAVENOUS at 08:02

## 2018-02-01 RX ADMIN — KETAMINE HYDROCHLORIDE 5 MCG/KG/MIN: 50 INJECTION INTRAMUSCULAR; INTRAVENOUS at 08:02

## 2018-02-01 RX ADMIN — ROCURONIUM BROMIDE 10 MG: 10 INJECTION, SOLUTION INTRAVENOUS at 07:02

## 2018-02-01 RX ADMIN — PROPOFOL 50 MG: 10 INJECTION, EMULSION INTRAVENOUS at 09:02

## 2018-02-01 RX ADMIN — PROPOFOL 50 MCG/KG/MIN: 10 INJECTION, EMULSION INTRAVENOUS at 08:02

## 2018-02-01 RX ADMIN — DEXAMETHASONE SODIUM PHOSPHATE 4 MG: 4 INJECTION, SOLUTION INTRAMUSCULAR; INTRAVENOUS at 07:02

## 2018-02-01 RX ADMIN — LIDOCAINE HYDROCHLORIDE 100 MG: 20 INJECTION, SOLUTION INTRAVENOUS at 07:02

## 2018-02-01 RX ADMIN — MUPIROCIN: 20 OINTMENT TOPICAL at 07:02

## 2018-02-01 RX ADMIN — LABETALOL HYDROCHLORIDE 5 MG: 5 INJECTION, SOLUTION INTRAVENOUS at 10:02

## 2018-02-01 RX ADMIN — ACETAMINOPHEN 1000 MG: 10 INJECTION, SOLUTION INTRAVENOUS at 08:02

## 2018-02-01 RX ADMIN — ROCURONIUM BROMIDE 30 MG: 10 INJECTION, SOLUTION INTRAVENOUS at 09:02

## 2018-02-01 RX ADMIN — CEFAZOLIN 2 G: 1 INJECTION, POWDER, FOR SOLUTION INTRAMUSCULAR; INTRAVENOUS at 06:02

## 2018-02-01 RX ADMIN — CEFAZOLIN 2 G: 1 INJECTION, POWDER, FOR SOLUTION INTRAMUSCULAR; INTRAVENOUS at 11:02

## 2018-02-01 RX ADMIN — CEFAZOLIN 3 G: 330 INJECTION, POWDER, FOR SOLUTION INTRAMUSCULAR; INTRAVENOUS at 08:02

## 2018-02-01 RX ADMIN — PHENYLEPHRINE HYDROCHLORIDE 50 MCG: 10 INJECTION INTRAVENOUS at 08:02

## 2018-02-01 RX ADMIN — PROMETHAZINE HYDROCHLORIDE 6.25 MG: 25 INJECTION INTRAMUSCULAR; INTRAVENOUS at 06:02

## 2018-02-01 RX ADMIN — LIDOCAINE HYDROCHLORIDE 1 MG: 10 INJECTION, SOLUTION EPIDURAL; INFILTRATION; INTRACAUDAL; PERINEURAL at 06:02

## 2018-02-01 RX ADMIN — MUPIROCIN 1 G: 20 OINTMENT TOPICAL at 09:02

## 2018-02-01 RX ADMIN — SUGAMMADEX 240 MG: 100 INJECTION, SOLUTION INTRAVENOUS at 01:02

## 2018-02-01 RX ADMIN — METOPROLOL TARTRATE 1 MG: 5 INJECTION, SOLUTION INTRAVENOUS at 01:02

## 2018-02-01 RX ADMIN — SODIUM CHLORIDE: 0.9 INJECTION, SOLUTION INTRAVENOUS at 07:02

## 2018-02-01 RX ADMIN — METHOCARBAMOL 750 MG: 750 TABLET ORAL at 03:02

## 2018-02-01 RX ADMIN — SODIUM CHLORIDE 1000 ML: 0.9 INJECTION, SOLUTION INTRAVENOUS at 06:02

## 2018-02-01 RX ADMIN — PROPOFOL 100 MG: 10 INJECTION, EMULSION INTRAVENOUS at 07:02

## 2018-02-01 NOTE — ANESTHESIA PROCEDURE NOTES
Arterial      Patient location during procedure: done in OR  Procedure start time: 2/1/2018 8:09 AM  Timeout: 2/1/2018 8:08 AM  Staffing  Resident/CRNA: MONO FOFANA  Anesthesiologist was present at the time of the procedure.  Preanesthetic Checklist  Completed: patient identified, site marked, surgical consent, pre-op evaluation, timeout performed, IV checked, risks and benefits discussed, monitors and equipment checked and anesthesia consent givenArterial  Skin Prep: chlorhexidine gluconate  Local Infiltration: none  Orientation: left  Location: radial  Catheter Size: 20 G  Catheter placement by Anatomical landmarks. Heme positive aspiration all ports.Insertion Attempts: 1  Assessment  Dressing: secured with tape and tegaderm, secured with tape and tegaderm  Patient: Tolerated well

## 2018-02-01 NOTE — H&P (VIEW-ONLY)
[facility] - Pre Op Consult  Progress Note    Patient Name: Daren Dubon  MRN: [id number]  Date of Evaluation- 01/22/2018  PCP- Luis Calderón MD    Future cases for Daren Dubon [[id number]]     Case ID Status Date Time João Procedure Provider Location    [id number] [facility] 2/1/2018  7:00  FUSION-SPINAL L1-Pelvis Revision Co Case with Dr. Solo Jonesuy Screws + T-PAL SNS: SSEP/EMG **AIRO Case** Mart Rivera MD [[id number]] [facility] OR 2ND FLR          HPI:  History of present illness- I had the pleasure of meeting this pleasant 22 y.o. gentleman in the pre op clinic prior to his elective spine surgery. The patient is new to me . Daren JEFFREY was accompanied by mother Patricia, Step father Jerardo.    I have obtained the history by speaking to the patient and by reviewing the electronic health records.    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  Pt presents today for for evaluation of R hip pain   X-ray pelvis reveals broken L iliac screw   CT scan shows minimal healing of PSF   revision PSF with TLIF    He has been troubled with  severe  Rt hip  pain for pain. Pain increases with sitting for long time and activity and decreases with Tramadol.  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     Had a history of chest pain , nonce since his back was operated   Used to slump towards the ;left side    Had a stress test about 10  years ago- to the mother's understanding - un remarkable        Had a history of  enlarged heart?, not confirmed   Had cardiac [facility] 2015 and was on  yearly checks   No heart failure history   His echo from 2008 reportedly showed mild concentric LVH with no LVOT obstruction   Possible mild diastolic dysfunction   Excellent systolic function   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   As per the record it can be associated with hypertrophic cardiomyopathy       Subjective/ Objective:          Chief complaint-Preoperative evaluation, Perioperative Medical management, complication reduction plan     Active cardiac conditions- none    Revised cardiac risk index predictors- none    Functional capacity -Examples of physical activity, confined to wheel chair, has strong upper body,  Cam move furniture with upper arms----- He can undertake all the above activities without  chest pain,chest tightness, Shortness of breath ,dizziness,lightheadedness making his exercise tolerance more, than 4 Mets.       Review of Systems   Constitutional: Negative for chills and fever.        Weight changes    Gained 30-40 pounds since 2016   Eating better since surgery   HENT:        MALINI 2/8    Neck size over 40 CM  Male gender   Eyes:        No unusual vision changes  Wears glasses   Respiratory:          Dry ,Cough   No hemoptysis   Cardiovascular:        As noted   Gastrointestinal:        Bowels- Regular  No overt GI/ blood losses   Endocrine:        Recent steroid use- none   Genitourinary: Negative for dysuria.        No urinary hesitancy   Musculoskeletal:        As above      Skin: Negative for rash.   Neurological: Negative for syncope.        No unilateral weakness   Hematological:        Current use of Anticoagulants/ Antiplatelet agent  None   Psychiatric/Behavioral:        Depression  Anxiety    None     No vascular stenting     No past medical history pertinent  negatives.    Past Surgical History:   Procedure Laterality Date    SCOLIOSIS REPAIR      wisdom teeth removal      age 18 years       No anesthesia, bleeding, cardiac problems , PONVwith previous surgeries/procedures.  Medications and Allergies reviewed in epic.   FH- No anesthesia, bleeding   in family   Maternal grand mother WPW  Lives with mother , step dad    Physical Exam   HENT:   Head: Normocephalic.       Physical Exam   HENT:   Head: Normocephalic.     Constitutional- Vitals - There is no height or weight on file to calculate BMI.,   Vitals:    01/22/18 1249   BP: 111/74   Pulse: 83   Temp: 99.8 °F (37.7 °C)     General appearance-Conscious,Coherent  Eyes- No conjunctival icterus,pupils  round  and reactive to light   ENT-Oral cavity- moist  , Hearing grossly normal   Neck- No thyromegaly ,Trachea -central, No jugular venous distension,   No Carotid Bruit   Cardiovascular -Heart Sounds- Normal  and  no murmur   , No gallop rhythm   Respiratory - Normal Respiratory Effort, Normal breath sounds,  no wheeze  and  no forced expiratory wheeze    Peripheral pitting pedal edema-- none , no calf pain   Gastrointestinal -Soft abdomen, No palpable masses, Non Tender,Liver,Spleen not palpable. No-- free fluid and shifting dullness  Musculoskeletal- No finger Clubbing. Strength 3/5 lower extremities   Lymphatic-No Palpable cervical, axillary,Inguinal lymphadenopathy   Psychiatric - normal effect,Orientation  Rt Dorsalis pedis pulses-palpable    Lt Dorsalis pedis pulses- palpable   Rt Posterior tibial pulses -palpable   Left posterior tibial pulses -palpable   Miscellaneous - tattoos,  no asterixis,  no dupuytren's contracture and  no renal bruit  Blood pressure 111/74, pulse 83, temperature 99.8 °F (37.7 °C), temperature source Oral, height 6' (1.829 m), SpO2 97 %.      Investigations  Lab and Imaging have been reviewed in [facility].    Review of Medicine tests    EKG- I had independently reviewed the EKG from--4/23/2016    It was reported to be showing     Sinus tachycardia  Possible RVH  Abnormal Q wave in lead III  T wave inversion in inferior leads  When compared with ECG of 20-APR-2016 12:42,  Vent. rate has increased BY  40 BPM  T wave inversion no longer evident in Lateral leads    2016       1 - Concentric remodeling.     2 - Normal left ventricular systolic function (EF 60-65%).     3 - Left ventricular diastolic dysfunction.     4 - The estimated PA systolic pressure is 40 mmHg.     5 - Mild to moderate pulmonic regurgitation.     6 - Normal right ventricular systolic function .     7 - Trivial tricuspid regurgitation.   Review of clinical lab tests-Date---1/22/2018  Creatinine-0.9   Date--1/22/2018 Hemoglobin--N  Platelet count--N      Review of old records- Was done and information gathered regards to events leading to surgery and health conditions of significance in the perioperative period.        Preoperative cardiac risk assessment-  The patient does not have any active cardiac conditions . Revised cardiac risk index predictors-0 ---.Functional capacity is more than 4 Mets. He will be undergoing a Spine procedure that carries a intermediate risk     The estimated risk of the rate of adverse cardiac outcomes  0.4%    No further cardiac work up is indicated prior to proceeding with the surgery     Orders Placed This Encounter    Hemoglobin A1c       American Society of Anesthesiologists Physical status classification ( ASA ) class: 3     Postoperative pulmonary complication risk assessment:        ARISCAT ( Canet) risk index- risk class -  Low    ACS NSQUIP risk - Serous complication risk - 4.5 % ( average risk is 2.9 %) , any complication 4.8 % ( Average is 3.1 %)       Assessment/Plan:     Abnormal EKG  No suggestion of of symptomatic CAD although reduced functional capacity  Confined to wheel chair   No longer having chest discomfort since spine surgery   2016- Global left ventricular systolic function appears  normal    Cardiomyopathy  No suggestion of Cardiac decompensation    Diastolic dysfunction  Diastolic Dysfunction:  I  suggest watching his fluid status perioperatively and to watch out for fluid overload in view of his Diastolic Dysfunction.  I suggest avoidance of the ordering of continuous IV fluids and if IV fluids are required ,to order for a specific duration of time and consider ordering lower IV fluid rate     RBC microcytosis  Normal Hb,HCT   No long standing NSAID use   Suggested follow up   No overt blood losses , Ulcer disease   No family history of thalassemia   Suggested follow up     Hyperglycemia  Mother reports increased thirst , Drinking water   will check A1c     Class 1 obesity with body mass index (BMI) of 31.0 to 31.9 in adult  Suggested weight loss    Pulmonary hypertension  2016- The estimated PA systolic pressure is 40 mmHg.    Low grade fever  will check urinalysis    Tattoos  No suggestion of liver decompensation          Preventive perioperative care    Thromboembolic prophylaxis:  His risk factors for thrombosis include obesity, surgical procedure and reduced mobility.I suggest  thromboembolic prophylaxis ( mechanical/pharmacological, weighing the risk benefits of pharmacological agent use considering mariajose procedural bleeding )  during the perioperative period.I suggested being active in the post operative period.      Postoperative pulmonary complication prophylaxis-Risk factors for post operative pulmonary complications include surgery lasting over 3 hours, ASA class >2 and functional dependence- I suggest incentive spirometry use, early ambulation, end tidal carbon dioxide monitoring and pain control so as to avoid diaphragmatic splinting  , oral care , head end of  Bed elevationm     Renal complication prophylaxis- I suggest keeping him well hydrated .I suggested drinking 2 litre's of water a day      Surgical site Infection Prophylaxis-I  suggest appropriate antibiotic for  Prophylaxis against Surgical site infections     In view of Spine procedure the patient  is at risk of postoperative urinary retention.  I suggest avoidance / minimizing the of  Benzodiazepines,Anticholinergic medication,antihistamines ( Benadryl) , if possible in the perioperative period. I suggest using the minimum possible use of opioids for the minimum period of time in the perioperative period. Benadryl avoidance suggested      This visit was focused on Preoperative evaluation, Perioperative Medical management, complication reduction plans. I suggest that the patient follows up with primary care or relevant sub specialists for ongoing health care.    I appreciate the opportunity to be involved in this patients care. Please feel free to contact me if there were any questions about this consultation.    Patient is pending optimization- A1c UA    Patient was instructed to call and update me about any changes to health, changes to medication, office visits , hospitalizations between now and surgery     Theresa Campbell MD  Perioperative Medicine  [facility]   Pager [phone]  -------    1/22- 18 36     Urinalysis not suggestive of UTI  ------    1/25- 7 58     A1c 5.5     Echo showed       1 - Normal left ventricular systolic function (EF 60-65%).     2 - Normal left ventricular diastolic function.     3 - Normal right ventricular systolic function .     4 - The estimated PA systolic pressure is 5 mmHg.     Echo showed no suggestion of Cardiomyopathy or Pulmonary HTN  Biventricular systolic function is normal   -----  1/26- 8 30     Called to discuss Echo,A1c   Spoke to mother   Suggested follow up about RBC Microcytosis- not on NSAID at this time   Was on Naproxen- for 2 months-none now  No family history of thalassemia, personal history of low Iron   Call, if needed

## 2018-02-01 NOTE — TRANSFER OF CARE
Anesthesia Transfer of Care Note    Patient: Daren Dubon    Procedure(s) Performed: Procedure(s) (LRB):  FUSION-SPINAL L1-Pelvis Revision Co Case with Dr. Solo Ball Screws + T-PAL SNS: SSEP/EMG **AIRO Case** (N/A)    Patient location: PACU    Anesthesia Type: general    Transport from OR: Transported from OR on 6-10 L/min O2 by face mask with adequate spontaneous ventilation    Post pain: adequate analgesia    Post assessment: no apparent anesthetic complications and tolerated procedure well    Post vital signs: stable    Level of consciousness: responds to stimulation    Nausea/Vomiting: no nausea/vomiting    Complications: none    Transfer of care protocol was followed      Last vitals:   Visit Vitals  BP (!) 83/55 (BP Location: Left arm, Patient Position: Lying)   Pulse 94   Temp 37.3 °C (99.1 °F) (Axillary)   Resp 15   Ht 6' (1.829 m)   Wt 120.2 kg (265 lb)   SpO2 (!) 94%   BMI 35.94 kg/m²

## 2018-02-02 LAB
ANION GAP SERPL CALC-SCNC: 9 MMOL/L
BASOPHILS # BLD AUTO: 0.01 K/UL
BASOPHILS NFR BLD: 0.1 %
BUN SERPL-MCNC: 11 MG/DL
CALCIUM SERPL-MCNC: 8 MG/DL
CHLORIDE SERPL-SCNC: 108 MMOL/L
CO2 SERPL-SCNC: 20 MMOL/L
CREAT SERPL-MCNC: 0.9 MG/DL
DIFFERENTIAL METHOD: ABNORMAL
EOSINOPHIL # BLD AUTO: 0.1 K/UL
EOSINOPHIL NFR BLD: 0.4 %
ERYTHROCYTE [DISTWIDTH] IN BLOOD BY AUTOMATED COUNT: 13 %
EST. GFR  (AFRICAN AMERICAN): >60 ML/MIN/1.73 M^2
EST. GFR  (NON AFRICAN AMERICAN): >60 ML/MIN/1.73 M^2
GLUCOSE SERPL-MCNC: 133 MG/DL
HCT VFR BLD AUTO: 37.1 %
HGB BLD-MCNC: 12.5 G/DL
IMM GRANULOCYTES # BLD AUTO: 0.08 K/UL
IMM GRANULOCYTES NFR BLD AUTO: 0.6 %
LYMPHOCYTES # BLD AUTO: 1.2 K/UL
LYMPHOCYTES NFR BLD: 8.9 %
MAGNESIUM SERPL-MCNC: 1.7 MG/DL
MCH RBC QN AUTO: 26.7 PG
MCHC RBC AUTO-ENTMCNC: 33.7 G/DL
MCV RBC AUTO: 79 FL
MONOCYTES # BLD AUTO: 1.3 K/UL
MONOCYTES NFR BLD: 9.2 %
NEUTROPHILS # BLD AUTO: 11.3 K/UL
NEUTROPHILS NFR BLD: 80.8 %
NRBC BLD-RTO: 0 /100 WBC
PHOSPHATE SERPL-MCNC: 3.9 MG/DL
PLATELET # BLD AUTO: 242 K/UL
PMV BLD AUTO: 10 FL
POTASSIUM SERPL-SCNC: 4.3 MMOL/L
RBC # BLD AUTO: 4.68 M/UL
SODIUM SERPL-SCNC: 137 MMOL/L
WBC # BLD AUTO: 13.92 K/UL

## 2018-02-02 PROCEDURE — 20000000 HC ICU ROOM

## 2018-02-02 PROCEDURE — 63600175 PHARM REV CODE 636 W HCPCS: Performed by: STUDENT IN AN ORGANIZED HEALTH CARE EDUCATION/TRAINING PROGRAM

## 2018-02-02 PROCEDURE — 25000003 PHARM REV CODE 250: Performed by: STUDENT IN AN ORGANIZED HEALTH CARE EDUCATION/TRAINING PROGRAM

## 2018-02-02 PROCEDURE — 80048 BASIC METABOLIC PNL TOTAL CA: CPT

## 2018-02-02 PROCEDURE — 87040 BLOOD CULTURE FOR BACTERIA: CPT | Mod: 59

## 2018-02-02 PROCEDURE — 85025 COMPLETE CBC W/AUTO DIFF WBC: CPT

## 2018-02-02 PROCEDURE — 87040 BLOOD CULTURE FOR BACTERIA: CPT

## 2018-02-02 PROCEDURE — 84100 ASSAY OF PHOSPHORUS: CPT

## 2018-02-02 PROCEDURE — 83735 ASSAY OF MAGNESIUM: CPT

## 2018-02-02 PROCEDURE — 25000003 PHARM REV CODE 250: Performed by: ORTHOPAEDIC SURGERY

## 2018-02-02 RX ORDER — LANOLIN ALCOHOL/MO/W.PET/CERES
400 CREAM (GRAM) TOPICAL ONCE
Status: COMPLETED | OUTPATIENT
Start: 2018-02-02 | End: 2018-02-02

## 2018-02-02 RX ORDER — OXYCODONE HYDROCHLORIDE 5 MG/1
10 TABLET ORAL EVERY 4 HOURS PRN
Status: DISCONTINUED | OUTPATIENT
Start: 2018-02-02 | End: 2018-02-03

## 2018-02-02 RX ORDER — OXYCODONE HYDROCHLORIDE 5 MG/1
5 TABLET ORAL EVERY 4 HOURS PRN
Status: DISCONTINUED | OUTPATIENT
Start: 2018-02-02 | End: 2018-02-03

## 2018-02-02 RX ADMIN — MUPIROCIN 1 G: 20 OINTMENT TOPICAL at 08:02

## 2018-02-02 RX ADMIN — METHOCARBAMOL 750 MG: 750 TABLET ORAL at 09:02

## 2018-02-02 RX ADMIN — OXYCODONE HYDROCHLORIDE 10 MG: 5 TABLET ORAL at 05:02

## 2018-02-02 RX ADMIN — OXYCODONE HYDROCHLORIDE 5 MG: 5 TABLET ORAL at 02:02

## 2018-02-02 RX ADMIN — ACETAMINOPHEN 1000 MG: 10 INJECTION, SOLUTION INTRAVENOUS at 12:02

## 2018-02-02 RX ADMIN — MAGNESIUM OXIDE TAB 400 MG (241.3 MG ELEMENTAL MG) 400 MG: 400 (241.3 MG) TAB at 05:02

## 2018-02-02 RX ADMIN — ACETAMINOPHEN 1000 MG: 10 INJECTION, SOLUTION INTRAVENOUS at 08:02

## 2018-02-02 RX ADMIN — BISACODYL 10 MG: 10 SUPPOSITORY RECTAL at 08:02

## 2018-02-02 RX ADMIN — METHOCARBAMOL 750 MG: 750 TABLET ORAL at 05:02

## 2018-02-02 RX ADMIN — OXYCODONE HYDROCHLORIDE 10 MG: 5 TABLET ORAL at 09:02

## 2018-02-02 RX ADMIN — CEFAZOLIN 2 G: 1 INJECTION, POWDER, FOR SOLUTION INTRAMUSCULAR; INTRAVENOUS at 05:02

## 2018-02-02 RX ADMIN — METHOCARBAMOL 750 MG: 750 TABLET ORAL at 02:02

## 2018-02-02 NOTE — DISCHARGE INSTRUCTIONS
DR. RYNE DESAI    POSTOPERATIVE LUMBAR FUSION INSTRUCTIONS    Antibiotics: You do not need additional antibiotics at home.    NSAIDs: Please refrain from taking ibuprofen (Advil), naproxen (Aleve), and other non steroidal anti-inflammatory medications (NSAIDs) as they may inhibit bone healing in the postoperative period.    Wound Care: You may remove your dressing and shower 7 days after surgery. Until then please keep your wound clean and dry. Sponge baths are acceptable. Do not go in a pool or hot tub until seen in clinic. Please leave the small steri-strips covering your wound in place until they fall off naturally (2 weeks). You may notice clear suture ends hanging from the sides of your incense after the steri-strips are removed, it is ok to clip these with scissors.    Brace: You may be prescribed a brace, please wear this when up and walking, it is not necessary to wear at night when sleeping.    Pain: You will be given a prescription for pain medicines and muscle relaxers before discharge. If you pain is not adequately controlled with these medications, please call the number below.    Infection: Signs of infection include increasing wound drainage and redness around the wound, as well as a temperature over 101.5 degrees. It is unnecessary to take your temperature on a routine basis. Please call the below number if you are concerned about an infection.    Driving and Work: It is ok to return to driving and work as long as you are not taking narcotic pain medications and can walk greater than 100 feet. Please do not lift over 10 pounds or participate in exercise or sports until cleared by Dr. Rivera.    Deep Venous Thrombosis (Blood Clots): Symptoms include swelling in the legs and shortness of breath. Please call the office or proceed to the nearest emergency room if you have any of these symptoms.    Physical Therapy: The best physical therapy after surgery is walking. Please try to walk as much as  possible.    Follow-up: You will be scheduled for a follow-up appointment in 2-3 weeks.    Questions: During business hours please call [phone] for routine questions. For after hours questions please call [phone] and ask to speak with the orthopaedic resident on call.

## 2018-02-02 NOTE — SUBJECTIVE & OBJECTIVE
Follow-up For: Procedure(s) (LRB):  FUSION-SPINAL L1-Pelvis Revision Co Case with Dr. Banda Depuy Screws + T-PAL SNS: SSEP/EMG **AIRO Case** (N/A)    Post-Operative Day: Day of Surgery     Past Medical History:   Diagnosis Date    Friedreich ataxia     Scoliosis        Past Surgical History:   Procedure Laterality Date    SCOLIOSIS REPAIR      wisdom teeth removal      age 18 years       Review of patient's allergies indicates:  No Known Allergies    Family History     Problem Relation (Age of Onset)    Heart disease Maternal Grandfather (43)    Hypertension Mother    No Known Problems Father, Brother        Social History Main Topics    Smoking status: Former Smoker    Smokeless tobacco: Never Used    Alcohol use No    Drug use: No    Sexual activity: Not on file      Review of Systems   Constitutional: Negative for chills, diaphoresis and fatigue.   Eyes: Negative for visual disturbance.   Respiratory: Negative for cough, shortness of breath and wheezing.    Cardiovascular: Negative for chest pain, palpitations and leg swelling.   Gastrointestinal: Negative for abdominal distention, abdominal pain, nausea and vomiting.   Musculoskeletal: Positive for back pain.   Neurological: Negative for weakness, numbness and headaches.     Objective:     Vital Signs (Most Recent):  Temp: 98.3 °F (36.8 °C) (02/01/18 2112)  Pulse: 87 (02/01/18 2130)  Resp: (!) 28 (02/01/18 2130)  BP: 121/75 (02/01/18 2112)  SpO2: (!) 92 % (02/01/18 2130) Vital Signs (24h Range):  Temp:  [98.3 °F (36.8 °C)-99.1 °F (37.3 °C)] 98.3 °F (36.8 °C)  Pulse:  [] 87  Resp:  [13-28] 28  SpO2:  [91 %-100 %] 92 %  BP: ()/(49-81) 121/75  Arterial Line BP: (124-146)/(70-75) 146/75     Weight: 120.2 kg (265 lb)  Body mass index is 35.94 kg/m².      Intake/Output Summary (Last 24 hours) at 02/01/18 2211  Last data filed at 02/01/18 2100   Gross per 24 hour   Intake             3425 ml   Output             1380 ml   Net             2045 ml        Physical Exam  Obese, well-nourished. NAD  [id number]  Normocephalic, atraumatic  Pupils equal and briskly react to light  EOMI, conjuctivae normal  Lungs CTA bilaterally  Heart RRR  Abdomen soft, non-tender, non-distended  LE pulses intact  MADERA with good strength  Sensation intact to light touch in all extremities  Surgical drains in place         Lines/Drains/Airways     Drain                 Closed/Suction Drain 02/01/18 1251 Right Back Accordion 10 Fr. less than 1 day         Closed/Suction Drain 02/01/18 1252 Right Back Accordion 10 Fr. less than 1 day         Urethral Catheter 02/01/18 0740 Straight-tip;Non-latex 16 Fr. less than 1 day          Arterial Line                 Arterial Line 02/01/18 0809 Left Radial less than 1 day          Peripheral Intravenous Line                 Peripheral IV - Single Lumen 02/01/18 0635 Right Antecubital less than 1 day         Peripheral IV - Single Lumen 02/01/18 0742 Left Hand less than 1 day                Significant Labs:    CBC/Anemia Profile:    Recent Labs  Lab 02/01/18  1015 02/01/18  1421   WBC  --  12.25   HGB  --  13.2*   HCT 35* 39.1*   PLT  --  233   MCV  --  79*   RDW  --  12.9        Chemistries:    Recent Labs  Lab 02/01/18  1421      K 4.3      CO2 19*   BUN 11   CREATININE 1.0   CALCIUM 8.0*       All pertinent labs within the past 24 hours have been reviewed.    Significant Imaging: I have reviewed all pertinent imaging results/findings within the past 24 hours.

## 2018-02-02 NOTE — ASSESSMENT & PLAN NOTE
POD -1 S/P L2-PELVIS revision posterior spinal fusion  Ancef postop  Mobilize today  Heparin for VTE prophylaxis starting Saturday  Stepdown today cirilo Sue MD  Orthopedic Surgery PGY-5  [phone] pager

## 2018-02-02 NOTE — HPI
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 He presents to SICU s/p L1-pelvis revision. He is unable to walk and uses a wheelchair at baseline. He currently denies focal LE weakness, numbness/tingling, abdominal or chest pain. His back pain is well controlled s/p intrathecal morphine.

## 2018-02-02 NOTE — NURSING TRANSFER
Nursing Transfer Note      2/2/2018     Transfer TO: XRAY FROM:SICU 6092    Transfer via SICU bed     Transfer with monitoring system     Transported by RN x1 and transport    Patient reassessed at: 2/2/18 @0900

## 2018-02-02 NOTE — PLAN OF CARE
Problem: Fall Risk (Adult)  Goal: Identify Related Risk Factors and Signs and Symptoms  Related risk factors and signs and symptoms are identified upon initiation of Human Response Clinical Practice Guideline (CPG)   Outcome: Ongoing (interventions implemented as appropriate)  .    Problem: Patient Care Overview  Goal: Plan of Care Review  Hx: Ruben Ataxia/Neuromuscular Scoliosis Sx: 4/21/16    Dx: L1 to Pelvis fusion revision ( L Iliac Screw loose from previous fusion in 2017)     2/1/18: L1-Pelvis fusion, Admit to SICU      MAP>65  SBP < 180   VSS. Pt on room air, sats >95% throughout night. Mother at bedside. SBP <180, MAP >65. 2 Hemovac drains to right back, drain # 1 to gravity, #2 to suction, moderate sanguinous ouput, see flow sheet data. No urine output noted in 2 hours, minimal visualized in bladder scan, <45ml, MD notified. Schultz removed 500ml output throughout night after schultz removed. Plan of care reviewed with patient and mother, questions and concerns addressed. Will continue to monitor.     Problem: Pressure Ulcer Risk (Gerry Scale) (Adult,Obstetrics,Pediatric)  Goal: Identify Related Risk Factors and Signs and Symptoms  Related risk factors and signs and symptoms are identified upon initiation of Human Response Clinical Practice Guideline (CPG)   Outcome: Ongoing (interventions implemented as appropriate)  .    Problem: Infection, Risk/Actual (Adult)  Goal: Identify Related Risk Factors and Signs and Symptoms  Related risk factors and signs and symptoms are identified upon initiation of Human Response Clinical Practice Guideline (CPG)   Outcome: Ongoing (interventions implemented as appropriate)  .

## 2018-02-02 NOTE — SUBJECTIVE & OBJECTIVE
Interval History/Significant Events:   -No acute events overnight    Follow-up For: Procedure(s) (LRB):  FUSION-SPINAL L1-Pelvis Revision Co Case with Dr. Solo Ball Screws + T-PAL SNS: SSEP/EMG **AIRO Case** (N/A)    Post-Operative Day: 1 Day Post-Op    Objective:     Vital Signs (Most Recent):  Temp: 98.7 °F (37.1 °C) (02/02/18 0300)  Pulse: 83 (02/02/18 0600)  Resp: 13 (02/02/18 0600)  BP: 124/71 (02/02/18 0515)  SpO2: 98 % (02/02/18 0600) Vital Signs (24h Range):  Temp:  [97.9 °F (36.6 °C)-99.1 °F (37.3 °C)] 98.7 °F (37.1 °C)  Pulse:  [] 83  Resp:  [10-31] 13  SpO2:  [90 %-100 %] 98 %  BP: ()/(49-81) 124/71  Arterial Line BP: (121-170)/() 121/70     Weight: 120.2 kg (265 lb 0.9 oz)  Body mass index is 35.95 kg/m².      Intake/Output Summary (Last 24 hours) at 02/02/18 0606  Last data filed at 02/02/18 0500   Gross per 24 hour   Intake             4110 ml   Output             2145 ml   Net             1965 ml       Physical Exam  Obese, well-nourished. NAD  [id number]  Normocephalic, atraumatic  Pupils equal and briskly react to light  EOMI, conjuctivae normal  Lungs CTA bilaterally  Heart RRR  Abdomen soft, non-tender, non-distended  LE pulses intact  MADERA with good strength  Sensation intact to light touch in all extremities  Surgical drains in place          Lines/Drains/Airways     Drain                 Closed/Suction Drain 02/01/18 1251 Right Back Accordion 10 Fr. less than 1 day         Closed/Suction Drain 02/01/18 1252 Right Back Accordion 10 Fr. less than 1 day          Arterial Line                 Arterial Line 02/01/18 0809 Left Radial less than 1 day          Peripheral Intravenous Line                 Peripheral IV - Single Lumen 02/01/18 0635 Right Antecubital less than 1 day         Peripheral IV - Single Lumen 02/01/18 0742 Left Hand less than 1 day                Significant Labs:    CBC/Anemia Profile:    Recent Labs  Lab 02/01/18  1015 02/01/18  1421 02/02/18  0243   WBC  --   12.25 13.92*   HGB  --  13.2* 12.5*   HCT 35* 39.1* 37.1*   PLT  --  233 242   MCV  --  79* 79*   RDW  --  12.9 13.0        Chemistries:    Recent Labs  Lab 02/01/18  1421 02/02/18  0243    137   K 4.3 4.3    108   CO2 19* 20*   BUN 11 11   CREATININE 1.0 0.9   CALCIUM 8.0* 8.0*   MG  --  1.7   PHOS  --  3.9       All pertinent labs within the past 24 hours have been reviewed.    Significant Imaging:  I have reviewed all pertinent imaging results/findings within the past 24 hours.

## 2018-02-02 NOTE — PROGRESS NOTES
[facility]  Orthopedics  Progress Note    Patient Name: Daren Dubon  MRN: [id number]  Admission Date: 2/1/2018  Hospital Length of Stay: 1 days  Attending Provider: Mart Rivera MD  Primary Care Provider: Luis Calderón MD  Follow-up For: Procedure(s) (LRB):  FUSION-SPINAL L1-Pelvis Revision Co Case with Dr. Banda Depuy Screws + T-PAL SNS: SSEP/EMG **AIRO Case** (N/A)    Post-Operative Day: 1 Day Post-Op  Subjective:     Principal Problem:Pseudoarthrosis of lumbar spine    Principal Orthopedic Problem: same    Interval History:  No acute events.  Pain controlled.    Review of patient's allergies indicates:  No Known Allergies    Current Facility-Administered Medications   Medication    0.9%  NaCl infusion    acetaminophen (10 mg/mL) injection 1,000 mg    aluminum-magnesium hydroxide-simethicone 200-200-20 mg/5 mL suspension 30 mL    bisacodyl suppository 10 mg    diphenhydrAMINE capsule 25 mg    [START ON 2/3/2018] heparin (porcine) injection 5,000 Units    methocarbamol tablet 750 mg    mupirocin 2 % ointment 1 g    ondansetron disintegrating tablet 8 mg    ondansetron injection 4 mg    promethazine (PHENERGAN) 6.25 mg in dextrose 5 % 50 mL IVPB    ramelteon tablet 8 mg    senna-docusate 8.6-50 mg per tablet 2 tablet     Objective:     Vital Signs (Most Recent):  Temp: 98.7 °F (37.1 °C) (02/02/18 0300)  Pulse: 99 (02/02/18 0630)  Resp: (!) 33 (02/02/18 0630)  BP: 124/71 (02/02/18 0515)  SpO2: 97 % (02/02/18 0630) Vital Signs (24h Range):  Temp:  [97.9 °F (36.6 °C)-99.1 °F (37.3 °C)] 98.7 °F (37.1 °C)  Pulse:  [] 99  Resp:  [10-33] 33  SpO2:  [90 %-100 %] 97 %  BP: ()/(49-81) 124/71  Arterial Line BP: (121-170)/() 121/70     Weight: 120.2 kg (265 lb 0.9 oz)  Height: 6' (182.9 cm)  Body mass index is 35.95 kg/m².      Intake/Output Summary (Last 24 hours) at 02/02/18 1741  Last data filed at 02/02/18 0500   Gross per 24 hour   Intake             4110 ml    Output             2110 ml   Net             2000 ml       Ortho/SPM Exam   Gen:  No acute distress  Head:  Normocephalic.  Atraumatic.  Neuro:  Intact  CV:  Peripherally well-perfused.  Pulses 2+ bilaterally.  Lungs:  Normal respiratory effort.  Abdomen:  Soft, non-tender, non-distended  Extremities:  No cyanosis, clubbing, or edema.  Incision c/d/i.  L2-S1 SILT  Chronic weakness ankle dorislfexion/plantarflexion    Significant Labs:     Significant Imaging:     Assessment/Plan:     Neuromuscular scoliosis    POD -1 S/P L2-PELVIS revision posterior spinal fusion  Ancef postop  Mobilize today  Heparin for VTE prophylaxis starting Saturday  Stepdown today cirilo Sue MD  Orthopedic Surgery PGY-5  [phone] pager                Sesar Sue MD  Orthopedics  [facility]

## 2018-02-02 NOTE — NURSING TRANSFER
Nursing Transfer Note      2/1/2018     Transfer To: 6092    Transfer via bed    Transfer with O2, cardiac monitoring    Transported by Nurse and pct    Medicines sent: yes    Chart send with patient: Yes    Notified: mother      Upon arrival to floor: cardiac monitor applied, patient oriented to room, call bell in reach and bed in lowest position

## 2018-02-02 NOTE — ASSESSMENT & PLAN NOTE
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 He presents to SICU s/p L1-pelvis revision (2/1).    Neuro  -Received intrathecal morphine 2/1  -Pain well controlled  -IV tylenol q8h  -Robaxin 750mg TID for muscle spasm  -q1h neurovascular checks  -Telemetry    Pulmonary   -O2 sats stable on RA    Cardiovascular  -Maintain MAP > 65  -h/o left ventricular diastolic dysfunction, will closely monitor fluid status     Renal  -UOP adequate, CTM  -Continue schultz    Gi: Docusate for constipation ppx    Id: Cefazolin 2G IV q6h for surgical ppx    FEN   -IVF @ 75cc/hr  -Electrolyte replacement as needed  -Clear liquid diet    Disposition: Continue care in surgical ICU. Anticipate stepdown today

## 2018-02-02 NOTE — SUBJECTIVE & OBJECTIVE
Principal Problem:Pseudoarthrosis of lumbar spine    Principal Orthopedic Problem: same    Interval History:  No acute events.  Pain controlled.    Review of patient's allergies indicates:  No Known Allergies    Current Facility-Administered Medications   Medication    0.9%  NaCl infusion    acetaminophen (10 mg/mL) injection 1,000 mg    aluminum-magnesium hydroxide-simethicone 200-200-20 mg/5 mL suspension 30 mL    bisacodyl suppository 10 mg    diphenhydrAMINE capsule 25 mg    [START ON 2/3/2018] heparin (porcine) injection 5,000 Units    methocarbamol tablet 750 mg    mupirocin 2 % ointment 1 g    ondansetron disintegrating tablet 8 mg    ondansetron injection 4 mg    promethazine (PHENERGAN) 6.25 mg in dextrose 5 % 50 mL IVPB    ramelteon tablet 8 mg    senna-docusate 8.6-50 mg per tablet 2 tablet     Objective:     Vital Signs (Most Recent):  Temp: 98.7 °F (37.1 °C) (02/02/18 0300)  Pulse: 99 (02/02/18 0630)  Resp: (!) 33 (02/02/18 0630)  BP: 124/71 (02/02/18 0515)  SpO2: 97 % (02/02/18 0630) Vital Signs (24h Range):  Temp:  [97.9 °F (36.6 °C)-99.1 °F (37.3 °C)] 98.7 °F (37.1 °C)  Pulse:  [] 99  Resp:  [10-33] 33  SpO2:  [90 %-100 %] 97 %  BP: ()/(49-81) 124/71  Arterial Line BP: (121-170)/() 121/70     Weight: 120.2 kg (265 lb 0.9 oz)  Height: 6' (182.9 cm)  Body mass index is 35.95 kg/m².      Intake/Output Summary (Last 24 hours) at 02/02/18 0754  Last data filed at 02/02/18 0500   Gross per 24 hour   Intake             4110 ml   Output             2110 ml   Net             2000 ml       Ortho/SPM Exam   Gen:  No acute distress  Head:  Normocephalic.  Atraumatic.  Neuro:  Intact  CV:  Peripherally well-perfused.  Pulses 2+ bilaterally.  Lungs:  Normal respiratory effort.  Abdomen:  Soft, non-tender, non-distended  Extremities:  No cyanosis, clubbing, or edema.  Incision c/d/i.  L2-S1 SILT  Chronic weakness ankle dorislfexion/plantarflexion    Significant Labs:     Significant  Imaging:

## 2018-02-02 NOTE — OP NOTE
DATE OF PROCEDURE:  02/01/2018    SURGEON:  Mart Rivera M.D.    CO-SURGEON:  Javier Banda M.D. of the  Pediatric Orthopedic Surgery Service.    PREOPERATIVE DIAGNOSIS:  Pseudoarthrosis, status post T2 to pelvis posterior   spinal fusion.    POSTOPERATIVE DIAGNOSIS:  Pseudoarthrosis, status post T2 to pelvis posterior   spinal fusion.    PROCEDURES PERFORMED:  1.  Revision posterior spinal fusion, L2 to pelvis.  2.  Removal and reinsertion of lumbar and posterior segmental instrumentation,   L2 to pelvis.  3.  Revision bilateral iliac fixation with S2 sacral ala or screws.  4.  Posterior lumbar interbody fusion, L5-S1.  5.  Application of titanium intervertebral spacer device To L5-S1 disk defect.  6.  Use of intraoperative CT-guided navigation.  7.  Local plus cadaveric bone grafting.    ANESTHESIA:  General endotracheal anesthesia.    ESTIMATED BLOOD LOSS:  300 mL    IMPLANTS:  DePuy Expedium and a size 11 titanium T-PAL cage.    SPECIMENS:  Hardware for gross pathology only.    SPONGE AND NEEDLE COUNT:  Correct x2.    NEUROLOGIC MONITORING:  Somatosensory evoked potentials and free-run EMG as well   as EMG stimulation of revised screws.  Please note, the left S1 nerve root,   stimulated at 2 milliamperes and the bilateral S1 screws and bilateral iliac   S2AI screws stimulated at greater than 20 milliamperes.    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  Unfortunately, the patient has developed a   pseudoarthrosis with broken hardware distally.  He has ongoing back pain and is   here for revision surgery.    DESCRIPTION OF INFORMED CONSENT:  I had a sit down discussion with the patient and his mother regarding the planned revision lumbar fusion. We specifically discussed the risks, benefits, and alternatives to surgery. We discussed the surgical procedure including  the skin incision, nerve decompression, bone fusion, allograft, iliac crest bone graft, and surgical implants including pedicle screws and interbody devices as indicated: they understand the risks include but are not limited to death, paralysis, blindness, bleeding, infection, damage to arteries, veins and nerves, spinal fluid leak, continued or worsening pain, no improvement in symptoms, non-union, and the possible need for more surgery in the future, the possible need for perioperative blood transfusion, as well as the possibility other unforseen and unknown complications. We talked about expected hospital stay and recovery period. All questions were answered; they understand and wish to proceed.       DESCRIPTION OF PROCEDURE:  The patient was met in the preoperative area where   his low back was marked as the operative site.  All final questions were   answered.  Sequential compression devices were placed in the patient's bilateral   calves and run throughout the case.  At this point, the patient was brought to   the Operating Room where general endotracheal anesthesia was induced.  Price   catheter was then inserted in stent sterile fashion.  The patient was then prone   onto a spinal navigation table.  The head was secured on the facial pillow.    All bony prominences were padded and personally checked by me, paying special   attention to the eyes, nose, mouth, axilla, elbows, hands, chest tips genitalia,   knees and feet.  Being satisfied with positioning and confirming it to be   adequate with Anesthesia, the patient's lower back was prepped and draped in   normal sterile fashion.    A full timeout was then called identifying the patient, the procedural site and   levels the availability of all instruments and implants and no specific nursing,   surgical, anesthetic or neurological monitoring concerns.  Finding that it was   safe to proceed with surgery, the patient was given weight appropriate dose of    Ancef by the Anesthesia Service.    Next, we infiltrated the planned incision with 10 mL of 1% lidocaine with   epinephrine.    Next, Dr. Banda and I made a midline lumbar incision and carried dissection   down through skin and subcutaneous tissues using combination of sharp dissection   and electrocautery where necessary.  The dorsal fascia was identified and   incised in midline and performed a preliminary revision subperiosteal exposure   of the patient's lumbar spine from L2 to the pelvis including exposure of the   hardware.  Significant metal debris was noted consistent with the patient's   pseudoarthrosis.  At this point, we removed set plugs from L2 all the way down   to the sacral bolts bilaterally.  The rods were then cut in situ just proximal   to the L3 pedicle screws.  The deirdre fragments were then removed.  The L3 pedicle   screws were removed and the pedicle screw holes were packed with Gelfoam.  We   then performed a revision exposure of all bony surfaces from L2 to the pelvis.    We then removed the bilateral sacral screws as well as bilateral iliac bolts in   a standard fashion.  The bilateral sacral tracts were then palpated and found to   be acceptable and screws were upsizing placed in the previously prepared   bilateral sacral screw sites.  At this point, I made a small incision over the   right PSIS and placed our navigation ray into the right pelvis.  We then   performed our navigation spin.  Using navigation assistance, we placed bilateral   S2 sacroiliac screws using in a standard fashion.  Pelvic inlet and outlet   x-rays were then taken, demonstrating adequate position of the screws.  The   wound was then thoroughly irrigated.  Next, we turned our attention to the   posterior lumbar interbody fusion.  An osteotome was used to remove the inferior   articular process of L5 bilaterally.  We then used a high-speed drill to thin   the L5 lamina to reveal the origin of the ligamentum flavum.   This was then   released from its origin, to reveal the blue white dura.  We then gently   expanded our decompression with a Kerrison punch.  A nerve root retractor was   then brought on the left side of thecal sac and use to protect it.  We then used   an osteotome to perform a superior facetectomy of S1 on the left.  We then   performed a subtotal L5-S1 diskectomy with a combination of straight and angled   curettes, disk ysabel as well as the pituitary rongeurs.  The disk space was   then thoroughly irrigated and sized to fit a size 11 spacer.  The spacer was   then packed with ViviGen bone graft.  The anterior aspect of this space was   packed with the patient's local morcellized bone graft and the cage was then   inserted in a standard sterile fashion.  AP and lateral radiographs were taken,   demonstrating correct level as well as adequate position of the cage.  Lateral   connectors and mary connectors were then used to incorporate rods into   patient's prior construct.  All implants were then final tightened with the    provided torque wrench.  The wound was then thoroughly irrigated,   including watches with Betadine infuse normal saline.  All bony surfaces from L2   and pelvis were decorticated with a high-speed drill.  The patient's local bone   graft mixed with the remainder of medium ViviGen in bone graft kit, as well as   1 g of vancomycin powder was laid dorsally from L2 to the pelvis.  A deep drain   was then placed.  The deep fascia was then closed with #1 Vicryl in an   interrupted figure-of-eight fashion.  Superficial layer was then irrigated and   closed over a drain with 2-0 Vicryl, 3-0 Monocryl, staples and a soft dressing.    The drains were secured in place with 2-0 silk sutures.  The patient was then   flipped supine, extubated and transferred to the Recovery Room in stable   condition.    JUSTIFICATION OF CO-SURGEON: This was a complex revision spinal deformity operation. The  combined efforts of two attending surgeons is indicated to decrease operative time, decrease blood loss, and improve outcomes.

## 2018-02-02 NOTE — CONSULTS
[facility]  Critical Care -Surgery  Consult Note    Patient Name: Daren Dubon  MRN: [id number]  Admission Date: 2/1/2018  Hospital Length of Stay: 0 days  Code Status: No Order  Attending Physician: Mart Rivera MD  Primary Care Provider: Luis Calderón MD   Principal Problem: <principal problem not specified>    Consults  Subjective:     HPI:  22M with h/o Frederich's Ataxia and neuromuscular scoliosis s/p T4-Pelvis PSF (4/21/2016)  p/w right hip pain and was subsequently found to have failed hardware at S2 . He presents to SICU s/p L1-pelvis revision. He is unable to walk and uses a wheelchair at baseline. He currently denies focal LE weakness, numbness/tingling, abdominal or chest pain. His back pain is well controlled s/p intrathecal morphine.     Follow-up For: Procedure(s) (LRB):  FUSION-SPINAL L1-Pelvis Revision Co Case with Dr. Solo Ball Screws + T-PAL SNS: SSEP/EMG **AIRO Case** (N/A)    Post-Operative Day: Day of Surgery     Past Medical History:   Diagnosis Date    Friedreich ataxia     Scoliosis        Past Surgical History:   Procedure Laterality Date    SCOLIOSIS REPAIR      wisdom teeth removal      age 18 years       Review of patient's allergies indicates:  No Known Allergies    Family History     Problem Relation (Age of Onset)    Heart disease Maternal Grandfather (43)    Hypertension Mother    No Known Problems Father, Brother        Social History Main Topics    Smoking status: Former Smoker    Smokeless tobacco: Never Used    Alcohol use No    Drug use: No    Sexual activity: Not on file      Review of Systems   Constitutional: Negative for chills, diaphoresis and fatigue.   Eyes: Negative for visual disturbance.   Respiratory: Negative for cough, shortness of breath and wheezing.    Cardiovascular: Negative for chest pain, palpitations and leg swelling.   Gastrointestinal: Negative for abdominal distention, abdominal pain, nausea and vomiting.    Musculoskeletal: Positive for back pain.   Neurological: Negative for weakness, numbness and headaches.     Objective:     Vital Signs (Most Recent):  Temp: 98.3 °F (36.8 °C) (02/01/18 2112)  Pulse: 87 (02/01/18 2130)  Resp: (!) 28 (02/01/18 2130)  BP: 121/75 (02/01/18 2112)  SpO2: (!) 92 % (02/01/18 2130) Vital Signs (24h Range):  Temp:  [98.3 °F (36.8 °C)-99.1 °F (37.3 °C)] 98.3 °F (36.8 °C)  Pulse:  [] 87  Resp:  [13-28] 28  SpO2:  [91 %-100 %] 92 %  BP: ()/(49-81) 121/75  Arterial Line BP: (124-146)/(70-75) 146/75     Weight: 120.2 kg (265 lb)  Body mass index is 35.94 kg/m².      Intake/Output Summary (Last 24 hours) at 02/01/18 2211  Last data filed at 02/01/18 2100   Gross per 24 hour   Intake             3425 ml   Output             1380 ml   Net             2045 ml       Physical Exam  Obese, well-nourished. NAD  [id number]  Normocephalic, atraumatic  Pupils equal and briskly react to light  EOMI, conjuctivae normal  Lungs CTA bilaterally  Heart RRR  Abdomen soft, non-tender, non-distended  LE pulses intact  MADERA with good strength  Sensation intact to light touch in all extremities  Surgical drains in place         Lines/Drains/Airways     Drain                 Closed/Suction Drain 02/01/18 1251 Right Back Accordion 10 Fr. less than 1 day         Closed/Suction Drain 02/01/18 1252 Right Back Accordion 10 Fr. less than 1 day         Urethral Catheter 02/01/18 0740 Straight-tip;Non-latex 16 Fr. less than 1 day          Arterial Line                 Arterial Line 02/01/18 0809 Left Radial less than 1 day          Peripheral Intravenous Line                 Peripheral IV - Single Lumen 02/01/18 0635 Right Antecubital less than 1 day         Peripheral IV - Single Lumen 02/01/18 0742 Left Hand less than 1 day                Significant Labs:    CBC/Anemia Profile:    Recent Labs  Lab 02/01/18  1015 02/01/18  1421   WBC  --  12.25   HGB  --  13.2*   HCT 35* 39.1*   PLT  --  233   MCV  --  79*   RDW  --   12.9        Chemistries:    Recent Labs  Lab 02/01/18  1421      K 4.3      CO2 19*   BUN 11   CREATININE 1.0   CALCIUM 8.0*       All pertinent labs within the past 24 hours have been reviewed.    Significant Imaging: I have reviewed all pertinent imaging results/findings within the past 24 hours.    Assessment/Plan:     Pseudoarthrosis of lumbar spine    22M with h/o Frederich's Ataxia and neuromuscular scoliosis s/p T4-Pelvis PSF (4/21/2016)  p/w right hip pain and was subsequently found to have failed hardware at S2 . He presents to SICU s/p L1-pelvis revision (2/1).    Neuro  -Received intrathecal morphine  -Pain well controlled  -IV tylenol q8h  -Robaxin 750mg TID for muscle spasm  -q1h neurovascular checks  -Telemetry    Pulmonary   -O2 sats stable on RA    Cardiovascular  -Maintain MAP > 65  -h/o left ventricular diastolic dysfunction, will closely monitor fluid status     Renal  -UOP adequate, CTM  -Continue schultz    Gi: Docusate for constipation ppx    Id: Cefazolin 2G IV q6h for surgical ppx    FEN   -IVF @ 75cc/hr  -Electrolyte replacement as needed  -Clear liquid diet    Disposition: Continue care in surgical ICU. Anticipate stepdown tomorrow.             Mervin Bassett,     Critical Care - Surgery  [facility]

## 2018-02-02 NOTE — PROGRESS NOTES
Research Encounter  Nova [id number]  IRB# [id number]   Subject: [id number]  Date: 2/1/2018  Visit 2, Day of Surgery    I, Kiko Campbell, met the patient in the [facility] before surgery. The patient arrived alert and orientated and indicated he wishes to continue participation in the trial. Before entering the room, I confirmed the patients planned index surgical procedure and reviewed his records for any AE/SCARs since his last study visit. No AE/SCARs were noted and this was confirmed by the patient and his mother, Patricia.  The patients mother and , Leslee Rosado, were present during the entirety of the study visit. A blood sample was collected. I performed nasal and oropharyngeal swabs as per the study protocol. POC glucose was taken.  I collected and reviewed the patients study diary with him and his mother. No ongoing symptoms were noted.  The patients concomitant medications were reviewed for study specific criteria. After leaving the room, I reviewed the patients medical records to complete a study specific preoperative assessment.

## 2018-02-02 NOTE — PROGRESS NOTES
[facility]  Critical Care - Surgery  Progress Note    Patient Name: Daren Dubon  MRN: [id number]  Admission Date: 2/1/2018  Hospital Length of Stay: 1 days  Code Status: No Order  Attending Provider: Mart Rivera MD  Primary Care Provider: Luis Calderón MD   Principal Problem: Pseudoarthrosis of lumbar spine    Subjective:     Hospital/ICU Course:  No notes on file    Interval History/Significant Events:   -No acute events overnight    Follow-up For: Procedure(s) (LRB):  FUSION-SPINAL L1-Pelvis Revision Co Case with Dr. Banda Depuy Screws + T-PAL SNS: SSEP/EMG **AIRO Case** (N/A)    Post-Operative Day: 1 Day Post-Op    Objective:     Vital Signs (Most Recent):  Temp: 98.7 °F (37.1 °C) (02/02/18 0300)  Pulse: 83 (02/02/18 0600)  Resp: 13 (02/02/18 0600)  BP: 124/71 (02/02/18 0515)  SpO2: 98 % (02/02/18 0600) Vital Signs (24h Range):  Temp:  [97.9 °F (36.6 °C)-99.1 °F (37.3 °C)] 98.7 °F (37.1 °C)  Pulse:  [] 83  Resp:  [10-31] 13  SpO2:  [90 %-100 %] 98 %  BP: ()/(49-81) 124/71  Arterial Line BP: (121-170)/() 121/70     Weight: 120.2 kg (265 lb 0.9 oz)  Body mass index is 35.95 kg/m².      Intake/Output Summary (Last 24 hours) at 02/02/18 0606  Last data filed at 02/02/18 0500   Gross per 24 hour   Intake             4110 ml   Output             2145 ml   Net             1965 ml       Physical Exam  Obese, well-nourished. NAD  [id number]  Normocephalic, atraumatic  Pupils equal and briskly react to light  EOMI, conjuctivae normal  Lungs CTA bilaterally  Heart RRR  Abdomen soft, non-tender, non-distended  LE pulses intact  MADERA with good strength  Sensation intact to light touch in all extremities  Surgical drains in place          Lines/Drains/Airways     Drain                 Closed/Suction Drain 02/01/18 1251 Right Back Accordion 10 Fr. less than 1 day         Closed/Suction Drain 02/01/18 1252 Right Back Accordion 10 Fr. less than 1 day          Arterial Line                  Arterial Line 02/01/18 0809 Left Radial less than 1 day          Peripheral Intravenous Line                 Peripheral IV - Single Lumen 02/01/18 0635 Right Antecubital less than 1 day         Peripheral IV - Single Lumen 02/01/18 0742 Left Hand less than 1 day                Significant Labs:    CBC/Anemia Profile:    Recent Labs  Lab 02/01/18  1015 02/01/18  1421 02/02/18  0243   WBC  --  12.25 13.92*   HGB  --  13.2* 12.5*   HCT 35* 39.1* 37.1*   PLT  --  233 242   MCV  --  79* 79*   RDW  --  12.9 13.0        Chemistries:    Recent Labs  Lab 02/01/18  1421 02/02/18  0243    137   K 4.3 4.3    108   CO2 19* 20*   BUN 11 11   CREATININE 1.0 0.9   CALCIUM 8.0* 8.0*   MG  --  1.7   PHOS  --  3.9       All pertinent labs within the past 24 hours have been reviewed.    Significant Imaging:  I have reviewed all pertinent imaging results/findings within the past 24 hours.    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 He presents to SICU s/p L1-pelvis revision (2/1).    Neuro  -Received intrathecal morphine 2/1  -Pain well controlled  -IV tylenol q8h  -Robaxin 750mg TID for muscle spasm  -q1h neurovascular checks  -Telemetry    Pulmonary   -O2 sats stable on RA    Cardiovascular  -Maintain MAP > 65  -h/o left ventricular diastolic dysfunction, will closely monitor fluid status     Renal  -UOP adequate, CTM  -Continue schultz    Gi: Docusate for constipation ppx    Id: Cefazolin 2G IV q6h for surgical ppx    FEN   -IVF @ 75cc/hr  -Electrolyte replacement as needed  -Clear liquid diet    Disposition: Continue care in surgical ICU. Anticipate stepdown today            Mervin Bassett,   Critical Care - Surgery  [facility]

## 2018-02-02 NOTE — PLAN OF CARE
PCP: Luis Calderón MD    Pharmacy:   [facility] - [place], [place] - [address]  [address] [facility] [address]  [place] [address]  Phone: [phone] Fax: [phone]    Payor: MEDICARE / Plan: MEDICARE PART A & B / Product Type: Government /     Patient currently in the ICU and not medically ready for discharge at this time. PT/OT ordered to eval and treat. PT/OT recs pending. SW and CM will continue to follow for any additional needs. Plan A to discharge home with home health as soon as medically stable. Plan B to discharge to SNF.     02/02/18 1437   Discharge Assessment   Assessment Type Discharge Planning Assessment   Assessment information obtained from? Medical Record   Expected Length of Stay (days) 4   Prior to hospitilization cognitive status: Alert/Oriented   Prior to hospitalization functional status: Assistive Equipment   Current cognitive status: Alert/Oriented   Current Functional Status: Assistive Equipment   Lives With grandparent(s)   Able to Return to Prior Arrangements unable to determine at this time (comments)   Is patient able to care for self after discharge? Unable to determine at this time (comments)   Who are your caregiver(s) and their phone number(s)? (Patricia Dubon (Mother) [phone], [phone]  )   Readmission Within The Last 30 Days no previous admission in last 30 days   Patient currently being followed by outpatient case management? No   Patient currently receives any other outside agency services? No   Equipment Currently Used at Home wheelchair   Do you have any problems affording any of your prescribed medications? No   Is the patient taking medications as prescribed? yes   Does the patient have transportation home? Yes   Transportation Available family or friend will provide   Does the patient receive services at the Coumadin Clinic? No   Discharge Plan A Home Health;Home with family   Discharge Plan B Skilled Nursing Facility

## 2018-02-02 NOTE — ASSESSMENT & PLAN NOTE
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 He presents to SICU s/p L1-pelvis revision (2/1).    Neuro  -Received intrathecal morphine  -Pain well controlled  -IV tylenol q8h  -Robaxin 750mg TID for muscle spasm  -q1h neurovascular checks  -Telemetry    Pulmonary   -O2 sats stable on RA    Cardiovascular  -Maintain MAP > 65  -h/o left ventricular diastolic dysfunction, will closely monitor fluid status     Renal  -UOP adequate, CTM  -Continue schultz    Gi: Docusate for constipation ppx    Id: Cefazolin 2G IV q6h for surgical ppx    FEN   -IVF @ 75cc/hr  -Electrolyte replacement as needed  -Clear liquid diet    Disposition: Continue care in surgical ICU. Anticipate stepdown tomorrow.

## 2018-02-02 NOTE — PLAN OF CARE
[facility]    HOME HEALTH ORDERS  FACE TO FACE ENCOUNTER    Patient Name: Daren Dubon  YOB: 1995    PCP: Luis Calderón MD   PCP Address: [address] / [place] [address]  PCP Phone Number: [phone]  PCP Fax: [phone]    Encounter Date: 02/01/2018    Admit to Home Health    Diagnoses:  Active Hospital Problems    Diagnosis  POA    Pseudoarthrosis of lumbar spine [S32.009K]  Yes      Resolved Hospital Problems    Diagnosis Date Resolved POA   No resolved problems to display.       No future appointments.        I have seen and examined this patient face to face today. My clinical findings that support the need for the home health skilled services and home bound status are the following:  Requiring assistive device to leave home due to unsteady gait caused by Surgery.    Allergies:Review of patient's allergies indicates:  No Known Allergies    Diet: regular diet    Activities: activity as tolerated    Home Health Admitting Clinician:   SN/PT to complete comprehensive assessment including routine vital signs. Instruct on disease process and s/s of complications to report to MD. Review/verify medication list sent home with the patient at time of discharge  and instruct patient/caregiver as needed. If coumadin ordered, coumadin clinic to manage INR with INR draws 2x per week with a goal to maintain INR between 1.8 and 2.2. Frequency may be adjusted depending on start of care date.    Notify MD if SBP > 160 or < 90; DBP > 90 or < 50; HR > 120 or < 50; Temp > 101    Home Medical Equipment:  Walker, 3-1 bedside commode, transfer tub bench    CONSULTS:    Physical Therapy may admit if patient not on coumadin, PT to perform comprehensive assessment if performing admit visit and generate therapy plan of care. Evaluate for home safety and equipment needs; Establish/upgrade home exercise program. Perform/instruct on therapeutic exercises, gait training, transfer training, and Range  of Motion.      OTHER: (only select if patient needs other therapy disciplines)  Occupational Therapy to evaluate and treat. Evaluate home environment for safety and equipment needs. Perform/Instruct on transfers, ADL training, ROM, and therapeutic exercises.    MISCELLANEOUS CARE:      WOUND CARE ORDERS:  Assess Surgical Incision/DSRG each TX  Aquacel AG drsg applied post-op leave on 14 days post op. Call MD if any drainage reaches border to border of drsg horizontally, s/s of infection, temp >101, induration, swelling or redness.  If dressing is removed per MD order, then apply island dressing, change/teach caregiver to perform daily dressing change if island dressing present.    Medications: Review discharge medications with patient and family and provide education.      Current Discharge Medication List      CONTINUE these medications which have NOT CHANGED    Details   traMADol (ULTRAM) 50 mg tablet Take 1 tablet (50 mg total) by mouth every evening.  Qty: 48 tablet, Refills: 0             I certify that this patient is confined to his home and needs intermittent skilled nursing care, physical therapy and occupational therapy.

## 2018-02-03 LAB
ANION GAP SERPL CALC-SCNC: 8 MMOL/L
BASOPHILS # BLD AUTO: 0.03 K/UL
BASOPHILS NFR BLD: 0.3 %
BUN SERPL-MCNC: 11 MG/DL
CALCIUM SERPL-MCNC: 8 MG/DL
CHLORIDE SERPL-SCNC: 106 MMOL/L
CO2 SERPL-SCNC: 22 MMOL/L
CREAT SERPL-MCNC: 1 MG/DL
DIFFERENTIAL METHOD: ABNORMAL
EOSINOPHIL # BLD AUTO: 0 K/UL
EOSINOPHIL NFR BLD: 0.2 %
ERYTHROCYTE [DISTWIDTH] IN BLOOD BY AUTOMATED COUNT: 13.2 %
EST. GFR  (AFRICAN AMERICAN): >60 ML/MIN/1.73 M^2
EST. GFR  (NON AFRICAN AMERICAN): >60 ML/MIN/1.73 M^2
GLUCOSE SERPL-MCNC: 132 MG/DL
HCT VFR BLD AUTO: 31.5 %
HGB BLD-MCNC: 10.4 G/DL
IMM GRANULOCYTES # BLD AUTO: 0.06 K/UL
IMM GRANULOCYTES NFR BLD AUTO: 0.6 %
LYMPHOCYTES # BLD AUTO: 1.3 K/UL
LYMPHOCYTES NFR BLD: 14 %
MCH RBC QN AUTO: 27 PG
MCHC RBC AUTO-ENTMCNC: 33 G/DL
MCV RBC AUTO: 82 FL
MONOCYTES # BLD AUTO: 1.2 K/UL
MONOCYTES NFR BLD: 13.2 %
NEUTROPHILS # BLD AUTO: 6.7 K/UL
NEUTROPHILS NFR BLD: 71.7 %
NRBC BLD-RTO: 0 /100 WBC
PLATELET # BLD AUTO: 174 K/UL
PMV BLD AUTO: 10.3 FL
POTASSIUM SERPL-SCNC: 3.6 MMOL/L
RBC # BLD AUTO: 3.85 M/UL
SODIUM SERPL-SCNC: 136 MMOL/L
WBC # BLD AUTO: 9.37 K/UL

## 2018-02-03 PROCEDURE — 20000000 HC ICU ROOM

## 2018-02-03 PROCEDURE — 85025 COMPLETE CBC W/AUTO DIFF WBC: CPT

## 2018-02-03 PROCEDURE — G8988 SELF CARE GOAL STATUS: HCPCS | Mod: CL

## 2018-02-03 PROCEDURE — G8989 SELF CARE D/C STATUS: HCPCS | Mod: CL

## 2018-02-03 PROCEDURE — 25000003 PHARM REV CODE 250: Performed by: STUDENT IN AN ORGANIZED HEALTH CARE EDUCATION/TRAINING PROGRAM

## 2018-02-03 PROCEDURE — 25000003 PHARM REV CODE 250: Performed by: ANESTHESIOLOGY

## 2018-02-03 PROCEDURE — 80048 BASIC METABOLIC PNL TOTAL CA: CPT

## 2018-02-03 PROCEDURE — 25000003 PHARM REV CODE 250: Performed by: ORTHOPAEDIC SURGERY

## 2018-02-03 PROCEDURE — G8987 SELF CARE CURRENT STATUS: HCPCS | Mod: CL

## 2018-02-03 PROCEDURE — 94761 N-INVAS EAR/PLS OXIMETRY MLT: CPT

## 2018-02-03 PROCEDURE — 97166 OT EVAL MOD COMPLEX 45 MIN: CPT

## 2018-02-03 PROCEDURE — 63600175 PHARM REV CODE 636 W HCPCS: Performed by: STUDENT IN AN ORGANIZED HEALTH CARE EDUCATION/TRAINING PROGRAM

## 2018-02-03 PROCEDURE — 97162 PT EVAL MOD COMPLEX 30 MIN: CPT

## 2018-02-03 RX ORDER — OXYCODONE HYDROCHLORIDE 5 MG/1
15 TABLET ORAL EVERY 4 HOURS PRN
Status: DISCONTINUED | OUTPATIENT
Start: 2018-02-03 | End: 2018-02-06 | Stop reason: HOSPADM

## 2018-02-03 RX ORDER — ACETAMINOPHEN 500 MG
1000 TABLET ORAL ONCE
Status: COMPLETED | OUTPATIENT
Start: 2018-02-03 | End: 2018-02-03

## 2018-02-03 RX ORDER — ACETAMINOPHEN 325 MG/1
650 TABLET ORAL ONCE
Status: COMPLETED | OUTPATIENT
Start: 2018-02-03 | End: 2018-02-04

## 2018-02-03 RX ORDER — ACETAMINOPHEN 10 MG/ML
1000 INJECTION, SOLUTION INTRAVENOUS EVERY 8 HOURS
Status: COMPLETED | OUTPATIENT
Start: 2018-02-03 | End: 2018-02-03

## 2018-02-03 RX ORDER — OXYCODONE HYDROCHLORIDE 5 MG/1
10 TABLET ORAL EVERY 4 HOURS PRN
Status: DISCONTINUED | OUTPATIENT
Start: 2018-02-03 | End: 2018-02-06 | Stop reason: HOSPADM

## 2018-02-03 RX ORDER — HYDROMORPHONE HYDROCHLORIDE 1 MG/ML
0.5 INJECTION, SOLUTION INTRAMUSCULAR; INTRAVENOUS; SUBCUTANEOUS EVERY 6 HOURS PRN
Status: DISCONTINUED | OUTPATIENT
Start: 2018-02-03 | End: 2018-02-03

## 2018-02-03 RX ADMIN — HEPARIN SODIUM 5000 UNITS: 5000 INJECTION, SOLUTION INTRAVENOUS; SUBCUTANEOUS at 10:02

## 2018-02-03 RX ADMIN — ACETAMINOPHEN 1000 MG: 500 TABLET ORAL at 03:02

## 2018-02-03 RX ADMIN — HEPARIN SODIUM 5000 UNITS: 5000 INJECTION, SOLUTION INTRAVENOUS; SUBCUTANEOUS at 05:02

## 2018-02-03 RX ADMIN — METHOCARBAMOL 750 MG: 750 TABLET ORAL at 01:02

## 2018-02-03 RX ADMIN — OXYCODONE HYDROCHLORIDE 10 MG: 5 TABLET ORAL at 01:02

## 2018-02-03 RX ADMIN — SODIUM CHLORIDE 1000 ML: 0.9 INJECTION, SOLUTION INTRAVENOUS at 07:02

## 2018-02-03 RX ADMIN — MUPIROCIN 1 G: 20 OINTMENT TOPICAL at 09:02

## 2018-02-03 RX ADMIN — HEPARIN SODIUM 5000 UNITS: 5000 INJECTION, SOLUTION INTRAVENOUS; SUBCUTANEOUS at 02:02

## 2018-02-03 RX ADMIN — METHOCARBAMOL 750 MG: 750 TABLET ORAL at 09:02

## 2018-02-03 RX ADMIN — OXYCODONE HYDROCHLORIDE 10 MG: 5 TABLET ORAL at 07:02

## 2018-02-03 RX ADMIN — OXYCODONE HYDROCHLORIDE 15 MG: 5 TABLET ORAL at 12:02

## 2018-02-03 RX ADMIN — METHOCARBAMOL 750 MG: 750 TABLET ORAL at 05:02

## 2018-02-03 RX ADMIN — MUPIROCIN 1 G: 20 OINTMENT TOPICAL at 08:02

## 2018-02-03 RX ADMIN — ACETAMINOPHEN 1000 MG: 10 INJECTION, SOLUTION INTRAVENOUS at 09:02

## 2018-02-03 RX ADMIN — OXYCODONE HYDROCHLORIDE 15 MG: 5 TABLET ORAL at 08:02

## 2018-02-03 RX ADMIN — ACETAMINOPHEN 1000 MG: 10 INJECTION, SOLUTION INTRAVENOUS at 04:02

## 2018-02-03 RX ADMIN — OXYCODONE HYDROCHLORIDE 15 MG: 5 TABLET ORAL at 04:02

## 2018-02-03 NOTE — PLAN OF CARE
Problem: Patient Care Overview  Goal: Plan of Care Review  Hx: Ruben Ataxia/Neuromuscular Scoliosis Sx: 4/21/16    Dx: L1 to Pelvis fusion revision ( L Iliac Screw loose from previous fusion in 2017)     2/1/18: L1-Pelvis fusion, Admit to SICU      MAP>65  SBP < 180   Outcome: Ongoing (interventions implemented as appropriate)  POC reviewed with patient who verbalized understanding. Pt tolerating diet well, no nausea/vomiting. Pt urinates via urinal. Urine output has been 600 ml for the night shift. Hemovac drains both draining bloody drainage, see intake and output flowsheet. Pain being controlled with PRN pain medication. Pt is on room air, and O2 saturation has been %. Pt remained free from falls throughout the shift VSS. No distress noted, will continue to monitor.

## 2018-02-03 NOTE — PLAN OF CARE
Problem: Occupational Therapy Goal  Goal: Occupational Therapy Goal  Outcome: Outcome(s) achieved Date Met: 02/03/18  Eval and D/C OT 2/3/18; pt at baseline level of function

## 2018-02-03 NOTE — PLAN OF CARE
Problem: Physical Therapy Goal  Goal: Physical Therapy Goal  Outcome: Outcome(s) achieved Date Met: 02/03/18  Eval completed. Pt at baseline functional mobility

## 2018-02-03 NOTE — PT/OT/SLP EVAL
Physical Therapy Evaluation/Discharge    Patient Name:  Daren Dubon   MRN:  [id number]  Co-eval with OT  Recommendations:     Discharge Recommendations:  home (with family)   Discharge Equipment Recommendations: none   Barriers to discharge: Decreased caregiver support at current functional level    Assessment:     Daren Dubon is a 22 y.o. male admitted with a medical diagnosis of Pseudoarthrosis of lumbar spine and Friedreich's ataxia.  He presents with the following impairments/functional limitations:  weakness, impaired endurance, impaired self care skills, impaired functional mobilty, impaired balance, impaired cognition, pain, decreased ROM. PT evaluation complete and no goals established. Pt is functioning at baseline and does not required acute care physical therapy services. Pt verbalized understanding. Please re-consult if functional mobility changes. Anticipate d/c to home with family. Mother reported she has no concerns about assist or equipment to appropriately care for pt at home.       Rehab Prognosis:  good; patient would benefit from acute skilled PT services to address these deficits and reach maximum level of function.      Recent Surgery: Procedure(s) (LRB):  FUSION-SPINAL L1-Pelvis Revision Co Case with Dr. Banda Depuy Screws + T-PAL SNS: SSEP/EMG **AIRO Case** (N/A) 2 Days Post-Op    Plan:     During this hospitalization, patient to be seen   to address the above listed problems via    · Plan of Care Expires:      Plan of Care Reviewed with: patient, mother    Subjective     Communicated with RN prior to session and mother present in room.  Patient found in bed upon PT entry to room, agreeable to evaluation.      Chief Complaint: back pain  Patient comments/goals: to return home   Pain/Comfort:  · Pain Rating 1: 9/10 (back )  · Pain Addressed 1: Reposition, Cessation of Activity, Distraction  · Pain Rating Post-Intervention 1: 9/10    Patients cultural, spiritual, Sabianism conflicts  given the current situation: none reported     Living Environment:  Pt lives with mother in [place] with no RONALD to enter. Pt dependent with all functional mobility and ADL's. Pt has zero to poor sitting balance and max decrease in strength and ROM of B LE 2nd to Friedreich's ataxia. Mother transfers pt to and from  daily via sliding board or stand pivot.  Patient has the following equipment: bedside commode, shower chair, hospital bed, slide board, wheelchair.  DME owned (not currently used): none.  Upon discharge, patient will have assistance from mother (available 24/7).    Objective:     Patient found with: telemetry, pulse ox (continuous), blood pressure cuff, peripheral IV, hemovac     General Precautions: Standard, fall   Orthopedic Precautions:N/A   Braces: N/A     Exams:  · Sensation:    · -       Intact; B LE light touch   · RLE ROM: max decrease in AROM  · RLE Strength: not formally tested 2nd to ROM deficits   · LLE ROM: max decrease in AROM   · LLE Strength: not formally tested 2nd to ROM deficits     Functional Mobility:  **Log rolling technique used to maintain a neutral spine   · Bed Mobility:     · Rolling Left:  total assistance  · Rolling Right: total assistance  · Scooting: total assistance  · Supine to Sit: total assistance  · Sit to Supine: total assistance  · Transfers:  Not performed   · Gait: not performed   · Balance:   · Static sit: total assist  · Dynamic sit: total assist x2 with severe posterior lean     AM-PAC 6 CLICK MOBILITY  Total Score:9       Therapeutic Activities and Exercises:  Educated pt on PT role/POC  Educated pt on how to re-consult therapy services  Pt verbalized understanding    Sitting EOB x 12 minutes to increase tolerance to OOB activity     Mother asked about concerns with assist level and equipment needed upon return home. Mother reported no concerns.     Patient left HOB elevated with all lines intact, call button in reach and RN and mother present.    GOALS:     Physical Therapy Goals     Not on file          Multidisciplinary Problems (Resolved)        Problem: Physical Therapy Goal    Goal Priority Disciplines Outcome Goal Variances Interventions   Physical Therapy Goal   (Resolved)     PT/OT, PT Outcome(s) achieved                     History:     Past Medical History:   Diagnosis Date    Friedreich ataxia     Scoliosis        Past Surgical History:   Procedure Laterality Date    SCOLIOSIS REPAIR      wisdom teeth removal      age 18 years       Time Tracking:     PT Received On: 02/03/18  PT Start Time: 0808     PT Stop Time: 0826  PT Total Time (min): 18 min     Billable Minutes: Evaluation 18    Yolande Long PT, DPT  2/3/2018  [phone]

## 2018-02-03 NOTE — ASSESSMENT & PLAN NOTE
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 He presents to SICU s/p L1-pelvis revision (2/1).    Neuro  -Received intrathecal morphine 2/1  -Pain well controlled  -IV tylenol q8h  -Robaxin 750mg TID for muscle spasm  -q1h neurovascular checks  -Telemetry    Pulmonary   -O2 sats stable on RA    Cardiovascular  -Maintain MAP > 65  -h/o left ventricular diastolic dysfunction, will closely monitor fluid status     Renal  -UOP adequate, CTM  -Continue schultz    Gi: Docusate for constipation ppx    Id:   -Cefazolin 2G IV q6h for surgical ppx  - fever of 102.5 overnight  - CXR: mild pulmonary edema/atalectasis  - BCx drawn  - WBC 9  - can consider additional antibiotics    FEN   -IVF @ 75cc/hr  -Electrolyte replacement as needed  -Clear liquid diet    Disposition: Continue care in surgical ICU. Anticipate stepdown today

## 2018-02-03 NOTE — PROGRESS NOTES
Patient's HR steadily has been trending up, currently 125-130bpm. Pt's temp at 1900 was 102. MD for SICU on call notified and a chest Xray, as well as 2 sets of blood cultures were ordered. MD on call for Ortho also notified of pt's status. Will continue to monitor.

## 2018-02-03 NOTE — SUBJECTIVE & OBJECTIVE
Principal Problem:Pseudoarthrosis of lumbar spine    Principal Orthopedic Problem: same    Interval History:    No acute events.  Pain controlled. Denies new numbness or tingling. Febrile last night to 102.    Review of patient's allergies indicates:  No Known Allergies    Current Facility-Administered Medications   Medication    0.9%  NaCl infusion    aluminum-magnesium hydroxide-simethicone 200-200-20 mg/5 mL suspension 30 mL    bisacodyl suppository 10 mg    diphenhydrAMINE capsule 25 mg    heparin (porcine) injection 5,000 Units    methocarbamol tablet 750 mg    mupirocin 2 % ointment 1 g    ondansetron disintegrating tablet 8 mg    ondansetron injection 4 mg    oxyCODONE immediate release tablet 10 mg    oxyCODONE immediate release tablet 5 mg    promethazine (PHENERGAN) 6.25 mg in dextrose 5 % 50 mL IVPB    ramelteon tablet 8 mg    senna-docusate 8.6-50 mg per tablet 2 tablet     Objective:     Vital Signs (Most Recent):  Temp: (!) 100.9 °F (38.3 °C) (02/03/18 0445)  Pulse: (!) 122 (02/03/18 0500)  Resp: 18 (02/03/18 0500)  BP: 115/67 (02/03/18 0500)  SpO2: (!) 94 % (02/03/18 0500) Vital Signs (24h Range):  Temp:  [98.4 °F (36.9 °C)-102.4 °F (39.1 °C)] 100.9 °F (38.3 °C)  Pulse:  [] 122  Resp:  [12-33] 18  SpO2:  [93 %-100 %] 94 %  BP: ()/(50-94) 115/67     Weight: 120.2 kg (265 lb 0.9 oz)  Height: 6' (182.9 cm)  Body mass index is 35.95 kg/m².      Intake/Output Summary (Last 24 hours) at 02/03/18 0545  Last data filed at 02/03/18 0500   Gross per 24 hour   Intake             2361 ml   Output             1874 ml   Net              487 ml       Ortho/SPM Exam     Gen:  No acute distress  Head:  Normocephalic.  Atraumatic.  Neuro:  Intact  CV:  Peripherally well-perfused.  Pulses 2+ bilaterally.  Lungs:  Normal respiratory effort.  Abdomen:  Soft, non-tender, non-distended  Extremities:  No cyanosis, clubbing, or edema.  Incision c/d/i.  L2-S1 SILT  Chronic weakness ankle  dorislfexion/plantarflexion    Significant Labs:     Significant Imaging:

## 2018-02-03 NOTE — PROGRESS NOTES
[facility]  Critical Care - Surgery  Progress Note    Patient Name: Daren Dubon  MRN: [id number]  Admission Date: 2/1/2018  Hospital Length of Stay: 2 days  Code Status: No Order  Attending Provider: Mart Rivera MD  Primary Care Provider: Luis Calderón MD   Principal Problem: Pseudoarthrosis of lumbar spine    Subjective:     Hospital/ICU Course:  No notes on file    Interval History/Significant Events: Patient febrile overnight with Tmax 102.5. Blood cultures drawn, CXR    Follow-up For: Procedure(s) (LRB):  FUSION-SPINAL L1-Pelvis Revision Co Case with Dr. Solo Ball Screws + T-PAL SNS: SSEP/EMG **AIRO Case** (N/A)    Post-Operative Day: 2 Days Post-Op    Objective:     Vital Signs (Most Recent):  Temp: (!) 100.9 °F (38.3 °C) (02/03/18 0445)  Pulse: (!) 113 (02/03/18 0600)  Resp: 17 (02/03/18 0600)  BP: 115/62 (02/03/18 0600)  SpO2: 99 % (02/03/18 0600) Vital Signs (24h Range):  Temp:  [98.4 °F (36.9 °C)-102.4 °F (39.1 °C)] 100.9 °F (38.3 °C)  Pulse:  [] 113  Resp:  [12-33] 17  SpO2:  [93 %-100 %] 99 %  BP: ()/(50-94) 115/62     Weight: 120.2 kg (265 lb 0.9 oz)  Body mass index is 35.95 kg/m².      Intake/Output Summary (Last 24 hours) at 02/03/18 0654  Last data filed at 02/03/18 0500   Gross per 24 hour   Intake             2361 ml   Output             1874 ml   Net              487 ml       Physical Exam   Constitutional: He is oriented to person, place, and time. He appears well-developed and well-nourished.   HENT:   Head: Normocephalic and atraumatic.   Eyes: EOM are normal. Pupils are equal, round, and reactive to light.   Neck: Normal range of motion. Neck supple.   Cardiovascular: Normal rate, regular rhythm and intact distal pulses.    Pulmonary/Chest: Effort normal and breath sounds normal.   Abdominal: Soft. Bowel sounds are normal.   Neurological: He is alert and oriented to person, place, and time.   Skin: Skin is warm and dry.       Vents:        Lines/Drains/Airways     Drain                 Closed/Suction Drain 02/01/18 1251 Right Back Accordion 10 Fr. 1 day         Closed/Suction Drain 02/01/18 1252 Right Back Accordion 10 Fr. 1 day          Peripheral Intravenous Line                 Peripheral IV - Single Lumen 02/01/18 0635 Right Antecubital 2 days         Peripheral IV - Single Lumen 02/01/18 0742 Left Hand 1 day                Significant Labs:    CBC/Anemia Profile:    Recent Labs  Lab 02/01/18  1421 02/02/18  0243 02/03/18  0300   WBC 12.25 13.92* 9.37   HGB 13.2* 12.5* 10.4*   HCT 39.1* 37.1* 31.5*    242 174   MCV 79* 79* 82   RDW 12.9 13.0 13.2        Chemistries:    Recent Labs  Lab 02/01/18  1421 02/02/18  0243 02/03/18  0300    137 136   K 4.3 4.3 3.6    108 106   CO2 19* 20* 22*   BUN 11 11 11   CREATININE 1.0 0.9 1.0   CALCIUM 8.0* 8.0* 8.0*   MG  --  1.7  --    PHOS  --  3.9  --        All pertinent labs within the past 24 hours have been reviewed.    Significant Imaging:  I have reviewed all pertinent imaging results/findings within the past 24 hours.    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 He presents to SICU s/p L1-pelvis revision (2/1).    Neuro  -Received intrathecal morphine 2/1  -Pain well controlled  -IV tylenol q8h  -Robaxin 750mg TID for muscle spasm  -q1h neurovascular checks  -Telemetry    Pulmonary   -O2 sats stable on RA    Cardiovascular  -Maintain MAP > 65  -h/o left ventricular diastolic dysfunction, will closely monitor fluid status     Renal  -UOP adequate, CTM  -Continue schultz    Gi: Docusate for constipation ppx    Id:   -Cefazolin 2G IV q6h for surgical ppx  - fever of 102.5 overnight  - CXR: mild pulmonary edema/atalectasis  - BCx drawn  - WBC 9  - can consider additional antibiotics    FEN   -IVF @  75cc/hr  -Electrolyte replacement as needed  -Clear liquid diet    Disposition: Continue care in surgical ICU. Anticipate stepdown today             July Gonzales MD   Anesthesia CA2  Critical Care - Surgery  [facility]

## 2018-02-03 NOTE — SUBJECTIVE & OBJECTIVE
Interval History/Significant Events: Patient febrile overnight with Tmax 102.5. Blood cultures drawn, CXR    Follow-up For: Procedure(s) (LRB):  FUSION-SPINAL L1-Pelvis Revision Co Case with Dr. Solo Ball Screws + T-PAL SNS: SSEP/EMG **AIRO Case** (N/A)    Post-Operative Day: 2 Days Post-Op    Objective:     Vital Signs (Most Recent):  Temp: (!) 100.9 °F (38.3 °C) (02/03/18 0445)  Pulse: (!) 113 (02/03/18 0600)  Resp: 17 (02/03/18 0600)  BP: 115/62 (02/03/18 0600)  SpO2: 99 % (02/03/18 0600) Vital Signs (24h Range):  Temp:  [98.4 °F (36.9 °C)-102.4 °F (39.1 °C)] 100.9 °F (38.3 °C)  Pulse:  [] 113  Resp:  [12-33] 17  SpO2:  [93 %-100 %] 99 %  BP: ()/(50-94) 115/62     Weight: 120.2 kg (265 lb 0.9 oz)  Body mass index is 35.95 kg/m².      Intake/Output Summary (Last 24 hours) at 02/03/18 0654  Last data filed at 02/03/18 0500   Gross per 24 hour   Intake             2361 ml   Output             1874 ml   Net              487 ml       Physical Exam   Constitutional: He is oriented to person, place, and time. He appears well-developed and well-nourished.   HENT:   Head: Normocephalic and atraumatic.   Eyes: EOM are normal. Pupils are equal, round, and reactive to light.   Neck: Normal range of motion. Neck supple.   Cardiovascular: Normal rate, regular rhythm and intact distal pulses.    Pulmonary/Chest: Effort normal and breath sounds normal.   Abdominal: Soft. Bowel sounds are normal.   Neurological: He is alert and oriented to person, place, and time.   Skin: Skin is warm and dry.       Vents:       Lines/Drains/Airways     Drain                 Closed/Suction Drain 02/01/18 1251 Right Back Accordion 10 Fr. 1 day         Closed/Suction Drain 02/01/18 1252 Right Back Accordion 10 Fr. 1 day          Peripheral Intravenous Line                 Peripheral IV - Single Lumen 02/01/18 0635 Right Antecubital 2 days         Peripheral IV - Single Lumen 02/01/18 0742 Left Hand 1 day                Significant  Labs:    CBC/Anemia Profile:    Recent Labs  Lab 02/01/18  1421 02/02/18  0243 02/03/18  0300   WBC 12.25 13.92* 9.37   HGB 13.2* 12.5* 10.4*   HCT 39.1* 37.1* 31.5*    242 174   MCV 79* 79* 82   RDW 12.9 13.0 13.2        Chemistries:    Recent Labs  Lab 02/01/18  1421 02/02/18  0243 02/03/18  0300    137 136   K 4.3 4.3 3.6    108 106   CO2 19* 20* 22*   BUN 11 11 11   CREATININE 1.0 0.9 1.0   CALCIUM 8.0* 8.0* 8.0*   MG  --  1.7  --    PHOS  --  3.9  --        All pertinent labs within the past 24 hours have been reviewed.    Significant Imaging:  I have reviewed all pertinent imaging results/findings within the past 24 hours.

## 2018-02-03 NOTE — PT/OT/SLP EVAL
Occupational Therapy   Evaluation and Discharge Note    Name: Daren Dubon  MRN: [id number]  Admitting Diagnosis:  Pseudoarthrosis of lumbar spine 2 Days Post-Op L1-pelvis spinal fusion revision; history of Friedreich's ataxia    Recommendations:     Discharge Recommendations: home (with continued 24 hr family assistance)  Discharge Equipment Recommendations:  none  Barriers to discharge:  None    History:     Occupational Profile:  Living Environment: Pt lives with his mother in 1-story house with 0 RONALD.   Previous level of function: Dependent with T/Fs to and from W/C via slide board or stand pivot with assist from mother; requires assist with ADLs  Equipment Owned:  bedside commode, shower chair, hospital bed, slide board, wheelchair  Assistance upon Discharge: Mother provides 24/7 assistance    Past Medical History:   Diagnosis Date    Friedreich ataxia     Scoliosis        Past Surgical History:   Procedure Laterality Date    SCOLIOSIS REPAIR      wisdom teeth removal      age 18 years       Subjective     Chief Complaint: Back pain  Patient/Family stated goals: Return home  Communicated with: RN prior to session.  Pain/Comfort:  · Pain Rating 1: 9/10  · Location 1: back  · Pain Addressed 1: Pre-medicate for activity, Reposition, Distraction  · Pain Rating Post-Intervention 1: 9/10    Patients cultural, spiritual, Druze conflicts given the current situation: None    Objective:     Patient found with: telemetry, pulse ox (continuous), blood pressure cuff, peripheral IV, hemovac    General Precautions: Standard, fall (spinal)   Orthopedic Precautions:LLE weight bearing as tolerated, RLE weight bearing as tolerated   Braces: N/A     Occupational Performance:    Bed Mobility:    **Log rolling technique used to maintain a neutral spine   · Bed Mobility:     · Rolling Left:  total assistance  · Rolling Right: total assistance  · Scooting: total assistance  · Supine to Sit: total assistance  · Sit to Supine:  "total assistance    Functional Mobility/Transfers:  · Did not occur    Activities of Daily Living:  · LB Dressing: total assistance      Cognitive/Visual Perceptual:  Cognitive/Psychosocial Skills:     -       Oriented to: Person, Place, Time and Situation   -       Follows Commands/attention:Follows multistep  commands  -       Communication: clear/fluent  -       Safety awareness/insight to disability: intact     Physical Exam:  Balance:    -       Static/dynamic sit Total A with posterior lean ~12 min EOB   Pt demo WFL B UE ROM/strength    Patient left HOB elevated with all lines intact, call button in reach and mother and RN present    [facility] 6 Click:  [facility] Total Score: 11    Treatment & Education:  OT/PT eval; white board updated; discussed D/C recs with pt and mother and both feel that pt is near baseline level of function and have no concerns about returning home  Education:    Assessment:     Daren Dubon is a 22 y.o. male with a medical diagnosis of Pseudoarthrosis of lumbar spine, history of Freidreich's ataxia. Pt requires Total A for T/Fs and assist with ADLs. At this time, patient is functioning at their prior level of function and does not require further acute OT services; has 24 hr assist at home.     Clinical Decision Makin.  OT Mod:  "Pt evaluation falls under moderate complexity for evaluation coding due to identification of 3-5 performance deficits noted as stated above. Eval required Min/Mod assistance to complete on this date and detailed assessment(s) were utilized. Moreover, an expanded review of history and occupational profile obtained with additional review of cognitive, physical and psychosocial hx."     Plan:     During this hospitalization, patient does not require further acute OT services.  Please re-consult if situation changes.    · Plan of Care Reviewed with: patient, mother    This Plan of care has been discussed with the patient who was involved in its development and " understands and is in agreement with the identified goals and treatment plan    GOALS:    Occupational Therapy Goals     Not on file          Multidisciplinary Problems (Resolved)        Problem: Occupational Therapy Goal    Goal Priority Disciplines Outcome Interventions   Occupational Therapy Goal   (Resolved)     OT, PT/OT Outcome(s) achieved                    Time Tracking:     OT Date of Treatment: 02/03/18  OT Start Time: 0808  OT Stop Time: 0826  OT Total Time (min): 18 min    Billable Minutes:Evaluation 18 minutes    ANAMIKA Jara  2/3/2018

## 2018-02-03 NOTE — PROGRESS NOTES
[facility]  Orthopedics  Progress Note    Patient Name: Daren Dubon  MRN: [id number]  Admission Date: 2/1/2018  Hospital Length of Stay: 2 days  Attending Provider: Mart Rivera MD  Primary Care Provider: Luis Calderón MD  Follow-up For: Procedure(s) (LRB):  FUSION-SPINAL L1-Pelvis Revision Co Case with Dr. Solo Jonesuy Screws + T-PAL SNS: SSEP/EMG **AIRO Case** (N/A)    Post-Operative Day: 2 Days Post-Op  Subjective:     Principal Problem:Pseudoarthrosis of lumbar spine    Principal Orthopedic Problem: same    Interval History:    No acute events.  Pain controlled. Denies new numbness or tingling. Febrile last night to 102.    Review of patient's allergies indicates:  No Known Allergies    Current Facility-Administered Medications   Medication    0.9%  NaCl infusion    aluminum-magnesium hydroxide-simethicone 200-200-20 mg/5 mL suspension 30 mL    bisacodyl suppository 10 mg    diphenhydrAMINE capsule 25 mg    heparin (porcine) injection 5,000 Units    methocarbamol tablet 750 mg    mupirocin 2 % ointment 1 g    ondansetron disintegrating tablet 8 mg    ondansetron injection 4 mg    oxyCODONE immediate release tablet 10 mg    oxyCODONE immediate release tablet 5 mg    promethazine (PHENERGAN) 6.25 mg in dextrose 5 % 50 mL IVPB    ramelteon tablet 8 mg    senna-docusate 8.6-50 mg per tablet 2 tablet     Objective:     Vital Signs (Most Recent):  Temp: (!) 100.9 °F (38.3 °C) (02/03/18 0445)  Pulse: (!) 122 (02/03/18 0500)  Resp: 18 (02/03/18 0500)  BP: 115/67 (02/03/18 0500)  SpO2: (!) 94 % (02/03/18 0500) Vital Signs (24h Range):  Temp:  [98.4 °F (36.9 °C)-102.4 °F (39.1 °C)] 100.9 °F (38.3 °C)  Pulse:  [] 122  Resp:  [12-33] 18  SpO2:  [93 %-100 %] 94 %  BP: ()/(50-94) 115/67     Weight: 120.2 kg (265 lb 0.9 oz)  Height: 6' (182.9 cm)  Body mass index is 35.95 kg/m².      Intake/Output Summary (Last 24 hours) at 02/03/18 0545  Last data filed at 02/03/18  0500   Gross per 24 hour   Intake             2361 ml   Output             1874 ml   Net              487 ml       Ortho/SPM Exam     Gen:  No acute distress  Head:  Normocephalic.  Atraumatic.  Neuro:  Intact  CV:  Peripherally well-perfused.  Pulses 2+ bilaterally.  Lungs:  Normal respiratory effort.  Abdomen:  Soft, non-tender, non-distended  Extremities:  No cyanosis, clubbing, or edema.  Incision c/d/i.  L2-S1 SILT  Chronic weakness ankle dorislfexion/plantarflexion    Significant Labs:     Significant Imaging:     Assessment/Plan:     Neuromuscular scoliosis    POD -2 S/P L2-PELVIS revision posterior spinal fusion    Pain control: oxycodone, acetaminophen, dilaudid for breakthrough  PT/OT: WBAT, ambulate with PT  DVT PPx: heparin 5000 q8h  - will obtain CXR and UA for fever  Drain: 280/16 - continue to monitor output    Dispo: stepdown to floor                  Ruthie Duarte MD  Orthopedics  [facility]

## 2018-02-03 NOTE — ASSESSMENT & PLAN NOTE
POD -2 S/P L2-PELVIS revision posterior spinal fusion    Pain control: oxycodone, acetaminophen, dilaudid for breakthrough  PT/OT: WBAT, ambulate with PT  DVT PPx: heparin 5000 q8h  - will obtain CXR and UA for fever  Drain: 280/16 - continue to monitor output    Dispo: stepdown to floor

## 2018-02-03 NOTE — PLAN OF CARE
Problem: Infection, Risk/Actual (Adult)  Intervention: Prevent Infection/Maximize Resistance  Pt. Medicated for 7-8/10 back pain, turning frequently, IS done with encouragement, voiding per urinal, appetite good for regular diet, mother feeds, drains x2 with minimal output, awaiting bed to step down.

## 2018-02-04 LAB
ANION GAP SERPL CALC-SCNC: 7 MMOL/L
BASOPHILS # BLD AUTO: 0.04 K/UL
BASOPHILS NFR BLD: 0.5 %
BLD PROD TYP BPU: NORMAL
BLD PROD TYP BPU: NORMAL
BLOOD UNIT EXPIRATION DATE: NORMAL
BLOOD UNIT EXPIRATION DATE: NORMAL
BLOOD UNIT TYPE CODE: 5100
BLOOD UNIT TYPE CODE: 5100
BLOOD UNIT TYPE: NORMAL
BLOOD UNIT TYPE: NORMAL
BUN SERPL-MCNC: 9 MG/DL
CALCIUM SERPL-MCNC: 8.3 MG/DL
CHLORIDE SERPL-SCNC: 106 MMOL/L
CO2 SERPL-SCNC: 24 MMOL/L
CODING SYSTEM: NORMAL
CODING SYSTEM: NORMAL
CREAT SERPL-MCNC: 0.8 MG/DL
DIFFERENTIAL METHOD: ABNORMAL
DISPENSE STATUS: NORMAL
DISPENSE STATUS: NORMAL
EOSINOPHIL # BLD AUTO: 0.1 K/UL
EOSINOPHIL NFR BLD: 0.6 %
ERYTHROCYTE [DISTWIDTH] IN BLOOD BY AUTOMATED COUNT: 12.9 %
EST. GFR  (AFRICAN AMERICAN): >60 ML/MIN/1.73 M^2
EST. GFR  (NON AFRICAN AMERICAN): >60 ML/MIN/1.73 M^2
GLUCOSE SERPL-MCNC: 133 MG/DL
HCT VFR BLD AUTO: 28.7 %
HGB BLD-MCNC: 9.5 G/DL
IMM GRANULOCYTES # BLD AUTO: 0.04 K/UL
IMM GRANULOCYTES NFR BLD AUTO: 0.5 %
LACTATE SERPL-SCNC: 0.8 MMOL/L
LYMPHOCYTES # BLD AUTO: 1.4 K/UL
LYMPHOCYTES NFR BLD: 16 %
MCH RBC QN AUTO: 26.2 PG
MCHC RBC AUTO-ENTMCNC: 33.1 G/DL
MCV RBC AUTO: 79 FL
MONOCYTES # BLD AUTO: 0.9 K/UL
MONOCYTES NFR BLD: 10.5 %
NEUTROPHILS # BLD AUTO: 6.2 K/UL
NEUTROPHILS NFR BLD: 71.9 %
NRBC BLD-RTO: 0 /100 WBC
PLATELET # BLD AUTO: 166 K/UL
PMV BLD AUTO: 10.4 FL
POCT GLUCOSE: 112 MG/DL (ref 70–110)
POTASSIUM SERPL-SCNC: 3.7 MMOL/L
RBC # BLD AUTO: 3.63 M/UL
SODIUM SERPL-SCNC: 137 MMOL/L
TRANS ERYTHROCYTES VOL PATIENT: NORMAL ML
TRANS ERYTHROCYTES VOL PATIENT: NORMAL ML
WBC # BLD AUTO: 8.65 K/UL

## 2018-02-04 PROCEDURE — 25000003 PHARM REV CODE 250: Performed by: STUDENT IN AN ORGANIZED HEALTH CARE EDUCATION/TRAINING PROGRAM

## 2018-02-04 PROCEDURE — 83605 ASSAY OF LACTIC ACID: CPT

## 2018-02-04 PROCEDURE — 99233 SBSQ HOSP IP/OBS HIGH 50: CPT | Mod: ,,, | Performed by: ANESTHESIOLOGY

## 2018-02-04 PROCEDURE — 87040 BLOOD CULTURE FOR BACTERIA: CPT

## 2018-02-04 PROCEDURE — 20600001 HC STEP DOWN PRIVATE ROOM

## 2018-02-04 PROCEDURE — 63600175 PHARM REV CODE 636 W HCPCS: Performed by: STUDENT IN AN ORGANIZED HEALTH CARE EDUCATION/TRAINING PROGRAM

## 2018-02-04 PROCEDURE — 80048 BASIC METABOLIC PNL TOTAL CA: CPT

## 2018-02-04 PROCEDURE — 85025 COMPLETE CBC W/AUTO DIFF WBC: CPT

## 2018-02-04 RX ORDER — ACETAMINOPHEN 325 MG/1
650 TABLET ORAL EVERY 6 HOURS PRN
Status: DISCONTINUED | OUTPATIENT
Start: 2018-02-04 | End: 2018-02-06 | Stop reason: HOSPADM

## 2018-02-04 RX ORDER — MORPHINE SULFATE 2 MG/ML
2 INJECTION, SOLUTION INTRAMUSCULAR; INTRAVENOUS ONCE
Status: COMPLETED | OUTPATIENT
Start: 2018-02-04 | End: 2018-02-04

## 2018-02-04 RX ADMIN — HEPARIN SODIUM 5000 UNITS: 5000 INJECTION, SOLUTION INTRAVENOUS; SUBCUTANEOUS at 05:02

## 2018-02-04 RX ADMIN — SODIUM CHLORIDE, SODIUM LACTATE, POTASSIUM CHLORIDE, AND CALCIUM CHLORIDE 1000 ML: .6; .31; .03; .02 INJECTION, SOLUTION INTRAVENOUS at 02:02

## 2018-02-04 RX ADMIN — OXYCODONE HYDROCHLORIDE 15 MG: 5 TABLET ORAL at 03:02

## 2018-02-04 RX ADMIN — METHOCARBAMOL 750 MG: 750 TABLET ORAL at 01:02

## 2018-02-04 RX ADMIN — HEPARIN SODIUM 5000 UNITS: 5000 INJECTION, SOLUTION INTRAVENOUS; SUBCUTANEOUS at 10:02

## 2018-02-04 RX ADMIN — OXYCODONE HYDROCHLORIDE 15 MG: 5 TABLET ORAL at 01:02

## 2018-02-04 RX ADMIN — ACETAMINOPHEN 650 MG: 325 TABLET, FILM COATED ORAL at 09:02

## 2018-02-04 RX ADMIN — ACETAMINOPHEN 650 MG: 325 TABLET, FILM COATED ORAL at 12:02

## 2018-02-04 RX ADMIN — HEPARIN SODIUM 5000 UNITS: 5000 INJECTION, SOLUTION INTRAVENOUS; SUBCUTANEOUS at 01:02

## 2018-02-04 RX ADMIN — MORPHINE SULFATE 2 MG: 2 INJECTION, SOLUTION INTRAMUSCULAR; INTRAVENOUS at 09:02

## 2018-02-04 RX ADMIN — METHOCARBAMOL 750 MG: 750 TABLET ORAL at 10:02

## 2018-02-04 RX ADMIN — OXYCODONE HYDROCHLORIDE 15 MG: 5 TABLET ORAL at 10:02

## 2018-02-04 RX ADMIN — BISACODYL 10 MG: 10 SUPPOSITORY RECTAL at 09:02

## 2018-02-04 RX ADMIN — OXYCODONE HYDROCHLORIDE 15 MG: 5 TABLET ORAL at 07:02

## 2018-02-04 RX ADMIN — METHOCARBAMOL 750 MG: 750 TABLET ORAL at 05:02

## 2018-02-04 RX ADMIN — ACETAMINOPHEN 650 MG: 325 TABLET, FILM COATED ORAL at 07:02

## 2018-02-04 RX ADMIN — MUPIROCIN 1 G: 20 OINTMENT TOPICAL at 01:02

## 2018-02-04 RX ADMIN — MUPIROCIN 1 G: 20 OINTMENT TOPICAL at 08:02

## 2018-02-04 RX ADMIN — SODIUM CHLORIDE: 0.9 INJECTION, SOLUTION INTRAVENOUS at 08:02

## 2018-02-04 NOTE — SUBJECTIVE & OBJECTIVE
Principal Problem:Pseudoarthrosis of lumbar spine    Principal Orthopedic Problem: same    Interval History:    No acute events.  Pain controlled. Denies new numbness or tingling. Febrile last night to 102.2. Pt denies fever, chills, NS.    Review of patient's allergies indicates:  No Known Allergies    Current Facility-Administered Medications   Medication    0.9%  NaCl infusion    aluminum-magnesium hydroxide-simethicone 200-200-20 mg/5 mL suspension 30 mL    bisacodyl suppository 10 mg    diphenhydrAMINE capsule 25 mg    heparin (porcine) injection 5,000 Units    methocarbamol tablet 750 mg    mupirocin 2 % ointment 1 g    ondansetron disintegrating tablet 8 mg    ondansetron injection 4 mg    oxyCODONE immediate release tablet 10 mg    oxyCODONE immediate release tablet 15 mg    promethazine (PHENERGAN) 6.25 mg in dextrose 5 % 50 mL IVPB    ramelteon tablet 8 mg    senna-docusate 8.6-50 mg per tablet 2 tablet     Objective:     Vital Signs (Most Recent):  Temp: 100.2 °F (37.9 °C) (02/04/18 0300)  Pulse: (!) 114 (02/04/18 0600)  Resp: 12 (02/04/18 0600)  BP: 119/67 (02/04/18 0600)  SpO2: 99 % (02/04/18 0600) Vital Signs (24h Range):  Temp:  [98.4 °F (36.9 °C)-102.2 °F (39 °C)] 100.2 °F (37.9 °C)  Pulse:  [106-126] 114  Resp:  [8-39] 12  SpO2:  [90 %-100 %] 99 %  BP: (102-143)/(55-97) 119/67     Weight: 120.2 kg (265 lb 0.9 oz)  Height: 6' (182.9 cm)  Body mass index is 35.95 kg/m².      Intake/Output Summary (Last 24 hours) at 02/04/18 0617  Last data filed at 02/04/18 0500   Gross per 24 hour   Intake             4759 ml   Output             1344 ml   Net             3415 ml       Ortho/SPM Exam     Gen:  No acute distress  Head:  Normocephalic.  Atraumatic.  Neuro:  Intact  CV:  Peripherally well-perfused.  Pulses 2+ bilaterally.  Lungs:  Normal respiratory effort.  Abdomen:  Soft, non-tender, non-distended  Extremities:  No cyanosis, clubbing, or edema.  Incision c/d/i.  L2-S1 SILT  Chronic  weakness ankle dorislfexion/plantarflexion  Drains: 9/10 of ss output    Significant Labs:     Significant Imaging:

## 2018-02-04 NOTE — NURSING
Okay to give Tylenol at this time, near limit for 24hr period, notify MD if still needing antipyretics per Dr. Chan.

## 2018-02-04 NOTE — SUBJECTIVE & OBJECTIVE
Interval History/Significant Events:     Pt febrile overnight (Tmax 102) and tachycardic (125 BPM). Labs (lactic- 0.8), cultures, 1L fluid bolus, and tylenol given. Tachycardia and fever improved.     Follow-up For: Procedure(s) (LRB):  FUSION-SPINAL L1-Pelvis Revision Co Case with Dr. Solo Ball Screws + T-PAL SNS: SSEP/EMG **AIRO Case** (N/A)    Post-Operative Day: 3 Days Post-Op    Objective:     Vital Signs (Most Recent):  Temp: 100.2 °F (37.9 °C) (02/04/18 0300)  Pulse: (!) 114 (02/04/18 0600)  Resp: 12 (02/04/18 0600)  BP: 119/67 (02/04/18 0600)  SpO2: 99 % (02/04/18 0600) Vital Signs (24h Range):  Temp:  [98.4 °F (36.9 °C)-102.2 °F (39 °C)] 100.2 °F (37.9 °C)  Pulse:  [106-126] 114  Resp:  [8-39] 12  SpO2:  [90 %-100 %] 99 %  BP: (102-143)/(55-97) 119/67     Weight: 120.2 kg (265 lb 0.9 oz)  Body mass index is 35.95 kg/m².      Intake/Output Summary (Last 24 hours) at 02/04/18 0642  Last data filed at 02/04/18 0500   Gross per 24 hour   Intake             4759 ml   Output             1344 ml   Net             3415 ml       Physical Exam   Constitutional: He is oriented to person, place, and time. He appears well-developed and well-nourished.   HENT:   Head: Normocephalic and atraumatic.   Eyes: EOM are normal. Pupils are equal, round, and reactive to light.   Neck: Normal range of motion. Neck supple.   Cardiovascular: Normal rate, regular rhythm and intact distal pulses.    Pulmonary/Chest: Effort normal and breath sounds normal.   Abdominal: Soft. Bowel sounds are normal.   Neurological: He is alert and oriented to person, place, and time.   Skin: Skin is warm and dry.       Vents:       Lines/Drains/Airways     Drain                 Closed/Suction Drain 02/01/18 1251 Right Back Accordion 10 Fr. 2 days         Closed/Suction Drain 02/01/18 1252 Right Back Accordion 10 Fr. 2 days          Peripheral Intravenous Line                 Peripheral IV - Single Lumen 02/01/18 0635 Right Antecubital 3 days          Peripheral IV - Single Lumen 02/01/18 0742 Left Hand 2 days                Significant Labs:    CBC/Anemia Profile:    Recent Labs  Lab 02/03/18  0300 02/04/18  0250   WBC 9.37 8.65   HGB 10.4* 9.5*   HCT 31.5* 28.7*    166   MCV 82 79*   RDW 13.2 12.9        Chemistries:    Recent Labs  Lab 02/03/18  0300 02/04/18  0250    137   K 3.6 3.7    106   CO2 22* 24   BUN 11 9   CREATININE 1.0 0.8   CALCIUM 8.0* 8.3*       All pertinent labs within the past 24 hours have been reviewed.    Significant Imaging:  I have reviewed all pertinent imaging results/findings within the past 24 hours.

## 2018-02-04 NOTE — ASSESSMENT & PLAN NOTE
POD -3 S/P L2-PELVIS revision posterior spinal fusion    Pain control: oxycodone, acetaminophen, dilaudid for breakthrough  PT/OT: WBAT, ambulate with PT  DVT PPx: heparin 5000 q8h  Drain: 10/9 - drain to be pulled today with XR  Encourage IS use and will get respiratory txt to prevent postop pulm complication  Dispo: stepdown to floor

## 2018-02-04 NOTE — NURSING TRANSFER
Nursing Transfer Note      2/4/2018     Transfer to  1027 from 6092    Transfer via bed    Transfer with IV pole, belongings    Transported by KESHA Zhang RN    Medicines sent: IV NS infusion    Chart send with patient: Yes    Notified: mother at BS      Patient reassessed at: 02/04 @1300    Upon arrival to floor: Report to ISAIAH Veliz    Pt. Awake, alert, appetite good for regular diet, mild relief after pain meds, monitor NSR, encouraging IS frequently, awaiting LS xray in department at 1400, transfer to  first on bed in stable condition.

## 2018-02-04 NOTE — PROGRESS NOTES
[facility]  Critical Care - Surgery  Progress Note    Patient Name: Daren Dubon  MRN: [id number]  Admission Date: 2/1/2018  Hospital Length of Stay: 3 days  Code Status: No Order  Attending Provider: Mart Rivera MD  Primary Care Provider: Luis Calderón MD   Principal Problem: Pseudoarthrosis of lumbar spine    Subjective:     Hospital/ICU Course:  No notes on file    Interval History/Significant Events:     Pt febrile overnight (Tmax 102) and tachycardic (125 BPM). Labs (lactic- 0.8), cultures, 1L fluid bolus, and tylenol given. Tachycardia and fever improved.     Follow-up For: Procedure(s) (LRB):  FUSION-SPINAL L1-Pelvis Revision Co Case with Dr. Solo Ball Screws + T-PAL SNS: SSEP/EMG **AIRO Case** (N/A)    Post-Operative Day: 3 Days Post-Op    Objective:     Vital Signs (Most Recent):  Temp: 100.2 °F (37.9 °C) (02/04/18 0300)  Pulse: (!) 114 (02/04/18 0600)  Resp: 12 (02/04/18 0600)  BP: 119/67 (02/04/18 0600)  SpO2: 99 % (02/04/18 0600) Vital Signs (24h Range):  Temp:  [98.4 °F (36.9 °C)-102.2 °F (39 °C)] 100.2 °F (37.9 °C)  Pulse:  [106-126] 114  Resp:  [8-39] 12  SpO2:  [90 %-100 %] 99 %  BP: (102-143)/(55-97) 119/67     Weight: 120.2 kg (265 lb 0.9 oz)  Body mass index is 35.95 kg/m².      Intake/Output Summary (Last 24 hours) at 02/04/18 0642  Last data filed at 02/04/18 0500   Gross per 24 hour   Intake             4759 ml   Output             1344 ml   Net             3415 ml       Physical Exam   Constitutional: He is oriented to person, place, and time. He appears well-developed and well-nourished.   HENT:   Head: Normocephalic and atraumatic.   Eyes: EOM are normal. Pupils are equal, round, and reactive to light.   Neck: Normal range of motion. Neck supple.   Cardiovascular: Normal rate, regular rhythm and intact distal pulses.    Pulmonary/Chest: Effort normal and breath sounds normal.   Abdominal: Soft. Bowel sounds are normal.   Neurological: He is alert and oriented  to person, place, and time.   Skin: Skin is warm and dry.       Vents:       Lines/Drains/Airways     Drain                 Closed/Suction Drain 02/01/18 1251 Right Back Accordion 10 Fr. 2 days         Closed/Suction Drain 02/01/18 1252 Right Back Accordion 10 Fr. 2 days          Peripheral Intravenous Line                 Peripheral IV - Single Lumen 02/01/18 0635 Right Antecubital 3 days         Peripheral IV - Single Lumen 02/01/18 0742 Left Hand 2 days                Significant Labs:    CBC/Anemia Profile:    Recent Labs  Lab 02/03/18  0300 02/04/18  0250   WBC 9.37 8.65   HGB 10.4* 9.5*   HCT 31.5* 28.7*    166   MCV 82 79*   RDW 13.2 12.9        Chemistries:    Recent Labs  Lab 02/03/18  0300 02/04/18  0250    137   K 3.6 3.7    106   CO2 22* 24   BUN 11 9   CREATININE 1.0 0.8   CALCIUM 8.0* 8.3*       All pertinent labs within the past 24 hours have been reviewed.    Significant Imaging:  I have reviewed all pertinent imaging results/findings within the past 24 hours.    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 He presents to SICU s/p L1-pelvis revision (2/1).    Neuro  -Received intrathecal morphine 2/1  -Pain well controlled  -Robaxin 750mg TID for muscle spasm  -q1h neurovascular checks  -Telemetry    Pulmonary   -O2 sats stable on RA    Cardiovascular  -Maintain MAP > 65  -h/o left ventricular diastolic dysfunction, will closely monitor fluid status     Renal  -UOP adequate, CTM  -Continue schultz    GI: Docusate for constipation ppx     ID:   -Cefazolin 2G IV q6h for surgical ppx  - fever of 102.5 overnight  - CXR: mild pulmonary edema/atalectasis  - BCx drawn x2  - WBC 8.65  - can consider additional antibiotics if fever doesn't  Improve with drains pulled    FEN   -IVF @ 75cc/hr  -Electrolyte  replacement as needed  -Clear liquid diet    Disposition: Continue care in surgical ICU. Anticipate stepdown today             Romero Santizo MD  Critical Care - Surgery  [facility]

## 2018-02-04 NOTE — PROGRESS NOTES
[facility]  Orthopedics  Progress Note    Patient Name: Daren Dubon  MRN: [id number]  Admission Date: 2/1/2018  Hospital Length of Stay: 3 days  Attending Provider: Mart Rivera MD  Primary Care Provider: Luis Calderón MD  Follow-up For: Procedure(s) (LRB):  FUSION-SPINAL L1-Pelvis Revision Co Case with Dr. Banda Depuy Screws + T-PAL SNS: SSEP/EMG **AIRO Case** (N/A)    Post-Operative Day: 3 Days Post-Op  Subjective:     Principal Problem:Pseudoarthrosis of lumbar spine    Principal Orthopedic Problem: same    Interval History:    No acute events.  Pain controlled. Denies new numbness or tingling. Febrile last night to 102.2. Pt denies fever, chills, NS.    Review of patient's allergies indicates:  No Known Allergies    Current Facility-Administered Medications   Medication    0.9%  NaCl infusion    aluminum-magnesium hydroxide-simethicone 200-200-20 mg/5 mL suspension 30 mL    bisacodyl suppository 10 mg    diphenhydrAMINE capsule 25 mg    heparin (porcine) injection 5,000 Units    methocarbamol tablet 750 mg    mupirocin 2 % ointment 1 g    ondansetron disintegrating tablet 8 mg    ondansetron injection 4 mg    oxyCODONE immediate release tablet 10 mg    oxyCODONE immediate release tablet 15 mg    promethazine (PHENERGAN) 6.25 mg in dextrose 5 % 50 mL IVPB    ramelteon tablet 8 mg    senna-docusate 8.6-50 mg per tablet 2 tablet     Objective:     Vital Signs (Most Recent):  Temp: 100.2 °F (37.9 °C) (02/04/18 0300)  Pulse: (!) 114 (02/04/18 0600)  Resp: 12 (02/04/18 0600)  BP: 119/67 (02/04/18 0600)  SpO2: 99 % (02/04/18 0600) Vital Signs (24h Range):  Temp:  [98.4 °F (36.9 °C)-102.2 °F (39 °C)] 100.2 °F (37.9 °C)  Pulse:  [106-126] 114  Resp:  [8-39] 12  SpO2:  [90 %-100 %] 99 %  BP: (102-143)/(55-97) 119/67     Weight: 120.2 kg (265 lb 0.9 oz)  Height: 6' (182.9 cm)  Body mass index is 35.95 kg/m².      Intake/Output Summary (Last 24 hours) at 02/04/18 0617  Last data  filed at 02/04/18 0500   Gross per 24 hour   Intake             4759 ml   Output             1344 ml   Net             3415 ml       Ortho/SPM Exam     Gen:  No acute distress  Head:  Normocephalic.  Atraumatic.  Neuro:  Intact  CV:  Peripherally well-perfused.  Pulses 2+ bilaterally.  Lungs:  Normal respiratory effort.  Abdomen:  Soft, non-tender, non-distended  Extremities:  No cyanosis, clubbing, or edema.  Incision c/d/i.  L2-S1 SILT  Chronic weakness ankle dorislfexion/plantarflexion  Drains: 9/10 of ss output    Significant Labs:     Significant Imaging:     Assessment/Plan:     Neuromuscular scoliosis    POD -3 S/P L2-PELVIS revision posterior spinal fusion    Pain control: oxycodone, acetaminophen, dilaudid for breakthrough  PT/OT: WBAT, ambulate with PT  DVT PPx: heparin 5000 q8h  Drain: 10/9 - drain to be pulled today with XR  Encourage IS use and will get respiratory txt to prevent postop pulm complication  Dispo: stepdown to floor                  Ruthie Duarte MD  Orthopedics  [facility]

## 2018-02-04 NOTE — ASSESSMENT & PLAN NOTE
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 He presents to SICU s/p L1-pelvis revision (2/1).    Neuro  -Received intrathecal morphine 2/1  -Pain well controlled  -Robaxin 750mg TID for muscle spasm  -q1h neurovascular checks  -Telemetry    Pulmonary   -O2 sats stable on RA    Cardiovascular  -Maintain MAP > 65  -h/o left ventricular diastolic dysfunction, will closely monitor fluid status     Renal  -UOP adequate, CTM  -Continue schultz    GI: Docusate for constipation ppx     ID:   -Cefazolin 2G IV q6h for surgical ppx  - fever of 102.5 overnight  - CXR: mild pulmonary edema/atalectasis  - BCx drawn x2  - WBC 8.65  - can consider additional antibiotics if fever doesn't  Improve with drains pulled    FEN   -IVF @ 75cc/hr  -Electrolyte replacement as needed  -Clear liquid diet    Disposition: Continue care in surgical ICU. Anticipate stepdown today

## 2018-02-04 NOTE — PLAN OF CARE
Problem: Patient Care Overview  Goal: Plan of Care Review  Hx: Ruben Ataxia/Neuromuscular Scoliosis Sx: 4/21/16    Dx: L1 to Pelvis fusion revision ( L Iliac Screw loose from previous fusion in 2017)     2/1/18: L1-Pelvis fusion, Admit to SICU      MAP>65  SBP < 180   Outcome: Ongoing (interventions implemented as appropriate)  POC reviewed with patient and mother who verbalized understanding. Pt tolerating diet well, no nausea/vomiting. Pt febrile during the shift, temp highest 102.2, currently at 100.2. 1 Liter LR bolus given, as well as two more sets of blood cultures drawn. Pt tachycardic during the shift as well. Pt is having adequate urine output, see intake and output flowsheet, passing gas, no bowel movement. Pain being controlled with PRN pain medication. Pt remained free from falls throughout the shift VSS. No distress noted, will continue to monitor.

## 2018-02-05 LAB
ANION GAP SERPL CALC-SCNC: 10 MMOL/L
BASOPHILS # BLD AUTO: 0.02 K/UL
BASOPHILS NFR BLD: 0.3 %
BUN SERPL-MCNC: 11 MG/DL
CALCIUM SERPL-MCNC: 8.8 MG/DL
CHLORIDE SERPL-SCNC: 103 MMOL/L
CO2 SERPL-SCNC: 26 MMOL/L
CREAT SERPL-MCNC: 0.9 MG/DL
DIFFERENTIAL METHOD: ABNORMAL
EOSINOPHIL # BLD AUTO: 0.1 K/UL
EOSINOPHIL NFR BLD: 1.4 %
ERYTHROCYTE [DISTWIDTH] IN BLOOD BY AUTOMATED COUNT: 12.7 %
EST. GFR  (AFRICAN AMERICAN): >60 ML/MIN/1.73 M^2
EST. GFR  (NON AFRICAN AMERICAN): >60 ML/MIN/1.73 M^2
GLUCOSE SERPL-MCNC: 86 MG/DL
HCT VFR BLD AUTO: 30.7 %
HGB BLD-MCNC: 10.2 G/DL
IMM GRANULOCYTES # BLD AUTO: 0.05 K/UL
IMM GRANULOCYTES NFR BLD AUTO: 0.6 %
LYMPHOCYTES # BLD AUTO: 1.5 K/UL
LYMPHOCYTES NFR BLD: 18.8 %
MCH RBC QN AUTO: 26.8 PG
MCHC RBC AUTO-ENTMCNC: 33.2 G/DL
MCV RBC AUTO: 81 FL
MONOCYTES # BLD AUTO: 0.7 K/UL
MONOCYTES NFR BLD: 9.1 %
NEUTROPHILS # BLD AUTO: 5.4 K/UL
NEUTROPHILS NFR BLD: 69.8 %
NRBC BLD-RTO: 0 /100 WBC
PLATELET # BLD AUTO: 203 K/UL
PMV BLD AUTO: 11.2 FL
POTASSIUM SERPL-SCNC: 3.9 MMOL/L
RBC # BLD AUTO: 3.8 M/UL
SODIUM SERPL-SCNC: 139 MMOL/L
WBC # BLD AUTO: 7.78 K/UL

## 2018-02-05 PROCEDURE — 63600175 PHARM REV CODE 636 W HCPCS: Performed by: STUDENT IN AN ORGANIZED HEALTH CARE EDUCATION/TRAINING PROGRAM

## 2018-02-05 PROCEDURE — 25000003 PHARM REV CODE 250: Performed by: STUDENT IN AN ORGANIZED HEALTH CARE EDUCATION/TRAINING PROGRAM

## 2018-02-05 PROCEDURE — 36415 COLL VENOUS BLD VENIPUNCTURE: CPT

## 2018-02-05 PROCEDURE — 80048 BASIC METABOLIC PNL TOTAL CA: CPT

## 2018-02-05 PROCEDURE — 20600001 HC STEP DOWN PRIVATE ROOM

## 2018-02-05 PROCEDURE — 85025 COMPLETE CBC W/AUTO DIFF WBC: CPT

## 2018-02-05 RX ADMIN — METHOCARBAMOL 750 MG: 750 TABLET ORAL at 09:02

## 2018-02-05 RX ADMIN — DIPHENHYDRAMINE HYDROCHLORIDE 25 MG: 25 CAPSULE ORAL at 09:02

## 2018-02-05 RX ADMIN — OXYCODONE HYDROCHLORIDE 15 MG: 5 TABLET ORAL at 05:02

## 2018-02-05 RX ADMIN — METHOCARBAMOL 750 MG: 750 TABLET ORAL at 06:02

## 2018-02-05 RX ADMIN — MUPIROCIN 1 G: 20 OINTMENT TOPICAL at 09:02

## 2018-02-05 RX ADMIN — OXYCODONE HYDROCHLORIDE 15 MG: 5 TABLET ORAL at 06:02

## 2018-02-05 RX ADMIN — ACETAMINOPHEN 650 MG: 325 TABLET, FILM COATED ORAL at 12:02

## 2018-02-05 RX ADMIN — METHOCARBAMOL 750 MG: 750 TABLET ORAL at 02:02

## 2018-02-05 RX ADMIN — OXYCODONE HYDROCHLORIDE 10 MG: 5 TABLET ORAL at 09:02

## 2018-02-05 RX ADMIN — HEPARIN SODIUM 5000 UNITS: 5000 INJECTION, SOLUTION INTRAVENOUS; SUBCUTANEOUS at 06:02

## 2018-02-05 RX ADMIN — HEPARIN SODIUM 5000 UNITS: 5000 INJECTION, SOLUTION INTRAVENOUS; SUBCUTANEOUS at 09:02

## 2018-02-05 RX ADMIN — OXYCODONE HYDROCHLORIDE 15 MG: 5 TABLET ORAL at 02:02

## 2018-02-05 RX ADMIN — DIPHENHYDRAMINE HYDROCHLORIDE 25 MG: 25 CAPSULE ORAL at 02:02

## 2018-02-05 RX ADMIN — OXYCODONE HYDROCHLORIDE 15 MG: 5 TABLET ORAL at 10:02

## 2018-02-05 RX ADMIN — OXYCODONE HYDROCHLORIDE 10 MG: 5 TABLET ORAL at 01:02

## 2018-02-05 RX ADMIN — HEPARIN SODIUM 5000 UNITS: 5000 INJECTION, SOLUTION INTRAVENOUS; SUBCUTANEOUS at 02:02

## 2018-02-05 NOTE — ANESTHESIA POSTPROCEDURE EVALUATION
Anesthesia Post Evaluation    Patient: Daren Dubon    Procedure(s) Performed: Procedure(s) (LRB):  FUSION-SPINAL L1-Pelvis Revision Co Case with Dr. Solo Ball Screws + T-PAL SNS: SSEP/EMG **AIRO Case** (N/A)    Final Anesthesia Type: general  Patient location during evaluation: PACU  Patient participation: Yes- Able to Participate  Level of consciousness: awake  Post-procedure vital signs: reviewed and stable  Pain management: adequate  Airway patency: patent  PONV status at discharge: No PONV  Anesthetic complications: no      Cardiovascular status: blood pressure returned to baseline and hemodynamically stable  Respiratory status: unassisted and spontaneous ventilation  Hydration status: euvolemic  Follow-up not needed.        Visit Vitals  BP (!) 156/70 (BP Location: Left arm, Patient Position: Lying)   Pulse 109   Temp 37.3 °C (99.2 °F) (Oral)   Resp 18   Ht 6' (1.829 m)   Wt 120.2 kg (265 lb 0.9 oz)   SpO2 98%   BMI 35.95 kg/m²       Pain/Chris Score: Pain Assessment Performed: Yes (2/4/2018  8:00 PM)  Presence of Pain: complains of pain/discomfort (2/4/2018  3:00 PM)  Pain Rating Prior to Med Admin: 9 (2/5/2018  5:59 AM)

## 2018-02-05 NOTE — PLAN OF CARE
Problem: Patient Care Overview  Goal: Plan of Care Review  Hx: Ruben Ataxia/Neuromuscular Scoliosis Sx: 4/21/16    Dx: L1 to Pelvis fusion revision ( L Iliac Screw loose from previous fusion in 2017)     2/1/18: L1-Pelvis fusion, Admit to SICU      MAP>65  SBP < 180   Outcome: Ongoing (interventions implemented as appropriate)  VSS. Temp ran 99.8. Mom remains @bedside.Call light and personal items in reach. Pt had a loose Bm. N/S maintained @ 75ml/hr. No changes over night.

## 2018-02-05 NOTE — PLAN OF CARE
Problem: Patient Care Overview  Goal: Plan of Care Review  Hx: Ruben Ataxia/Neuromuscular Scoliosis Sx: 4/21/16    Dx: L1 to Pelvis fusion revision ( L Iliac Screw loose from previous fusion in 2017)     2/1/18: L1-Pelvis fusion, Admit to SICU      MAP>65  SBP < 180   Outcome: Ongoing (interventions implemented as appropriate)  Pt AAOX4. Avss, pulse running 110s, asymptomatic. Pt c/o pain controlled with PRN pain medication. Pt urinating frequently. Incision site CDI. Pt safety maintained. Hourly rounding performed.

## 2018-02-05 NOTE — PLAN OF CARE
POD 5 s/p L1-pelvis revision. PT/OT ordered to eval and treat. PT/OT recommending home with family support. SW and CM will continue to follow for any additional needs. Plan A to discharge home with family support as soon as medically stable. Plan B to discharge home with home health.      02/05/18 2619   Discharge Reassessment   Assessment Type Discharge Planning Reassessment   Provided patient/caregiver education on the expected discharge date and the discharge plan Yes   Do you have any problems affording any of your prescribed medications? TBD   Discharge Plan A Home with family   Discharge Plan B Home Health;Home with family   Patient choice form signed by patient/caregiver N/A   Can the patient answer the patient profile reliably? Yes, cognitively intact   How does the patient rate their overall health at the present time? Fair   Describe the patient's ability to walk at the present time. Does not walk or unable to take any steps at all   How often would a person be available to care for the patient? Whenever needed   Number of comorbid conditions (as recorded on the chart) Two   During the past month, has the patient often been bothered by feeling down, depressed or hopeless? No   During the past month, has the patient often been bothered by little interest or pleasure in doing things? No

## 2018-02-05 NOTE — NURSING
Pt c/o new onset headache. No variation in pain level between lying flat and sitting up. /80, pulse 109, Temp 100.0. MD notified, stated to give Prn tylenol for relief. Will continue to monitor.

## 2018-02-05 NOTE — PROGRESS NOTES
[facility]  Orthopedics  Progress Note    Patient Name: Daren Dubon  MRN: [id number]  Admission Date: 2/1/2018  Hospital Length of Stay: 4 days  Attending Provider: Mart Rivera MD  Primary Care Provider: Luis Calderón MD  Follow-up For: Procedure(s) (LRB):  FUSION-SPINAL L1-Pelvis Revision Co Case with Dr. Banda Depuy Screws + T-PAL SNS: SSEP/EMG **AIRO Case** (N/A)    Post-Operative Day: 4 Days Post-Op  Subjective:     Principal Problem:Pseudoarthrosis of lumbar spine    Principal Orthopedic Problem: same    Interval History:    No acute events.  Pain controlled. Denies new numbness or tingling. Febrile yesterday to 102.2. Pt denies fever, chills, NS.    Review of patient's allergies indicates:  No Known Allergies    Current Facility-Administered Medications   Medication    0.9%  NaCl infusion    acetaminophen tablet 650 mg    aluminum-magnesium hydroxide-simethicone 200-200-20 mg/5 mL suspension 30 mL    bisacodyl suppository 10 mg    diphenhydrAMINE capsule 25 mg    heparin (porcine) injection 5,000 Units    methocarbamol tablet 750 mg    mupirocin 2 % ointment 1 g    ondansetron disintegrating tablet 8 mg    ondansetron injection 4 mg    oxyCODONE immediate release tablet 10 mg    oxyCODONE immediate release tablet 15 mg    promethazine (PHENERGAN) 6.25 mg in dextrose 5 % 50 mL IVPB    ramelteon tablet 8 mg    senna-docusate 8.6-50 mg per tablet 2 tablet     Objective:     Vital Signs (Most Recent):  Temp: 99.2 °F (37.3 °C) (02/05/18 0421)  Pulse: 109 (02/05/18 0421)  Resp: 18 (02/05/18 0421)  BP: (!) 156/70 (02/05/18 0421)  SpO2: 98 % (02/05/18 0421) Vital Signs (24h Range):  Temp:  [99.2 °F (37.3 °C)-102 °F (38.9 °C)] 99.2 °F (37.3 °C)  Pulse:  [105-120] 109  Resp:  [0-25] 18  SpO2:  [90 %-100 %] 98 %  BP: (117-156)/(62-87) 156/70     Weight: 120.2 kg (265 lb 0.9 oz)  Height: 6' (182.9 cm)  Body mass index is 35.95 kg/m².      Intake/Output Summary (Last 24 hours)  at 02/05/18 0710  Last data filed at 02/05/18 0400   Gross per 24 hour   Intake                0 ml   Output             2200 ml   Net            -2200 ml       Ortho/SPM Exam     Gen:  No acute distress  Head:  Normocephalic.  Atraumatic.  Neuro:  Intact  CV:  Peripherally well-perfused.  Pulses 2+ bilaterally.  Lungs:  Normal respiratory effort.  Abdomen:  Soft, non-tender, non-distended  Extremities:  No cyanosis, clubbing, or edema.  Incision c/d/i.  L2-S1 SILT  Chronic weakness ankle dorislfexion/plantarflexion      Assessment/Plan:     Neuromuscular scoliosis    POD 4 S/P L2-PELVIS revision posterior spinal fusion    Pain control: oxycodone, acetaminophen, dilaudid for breakthrough  PT/OT: WBAT, ambulate with PT  DVT PPx: heparin 5000 q8h  Encourage IS use and will get respiratory txt to prevent postop pulm complication  Dispo: Advance diet, dc IVF, PT/OT. Discharge disposition pending.                  Riky Jasso MD  Orthopedics  [facility]

## 2018-02-05 NOTE — SUBJECTIVE & OBJECTIVE
Principal Problem:Pseudoarthrosis of lumbar spine    Principal Orthopedic Problem: same    Interval History:    No acute events.  Pain controlled. Denies new numbness or tingling. Febrile yesterday to 102.2. Pt denies fever, chills, NS.    Review of patient's allergies indicates:  No Known Allergies    Current Facility-Administered Medications   Medication    0.9%  NaCl infusion    acetaminophen tablet 650 mg    aluminum-magnesium hydroxide-simethicone 200-200-20 mg/5 mL suspension 30 mL    bisacodyl suppository 10 mg    diphenhydrAMINE capsule 25 mg    heparin (porcine) injection 5,000 Units    methocarbamol tablet 750 mg    mupirocin 2 % ointment 1 g    ondansetron disintegrating tablet 8 mg    ondansetron injection 4 mg    oxyCODONE immediate release tablet 10 mg    oxyCODONE immediate release tablet 15 mg    promethazine (PHENERGAN) 6.25 mg in dextrose 5 % 50 mL IVPB    ramelteon tablet 8 mg    senna-docusate 8.6-50 mg per tablet 2 tablet     Objective:     Vital Signs (Most Recent):  Temp: 99.2 °F (37.3 °C) (02/05/18 0421)  Pulse: 109 (02/05/18 0421)  Resp: 18 (02/05/18 0421)  BP: (!) 156/70 (02/05/18 0421)  SpO2: 98 % (02/05/18 0421) Vital Signs (24h Range):  Temp:  [99.2 °F (37.3 °C)-102 °F (38.9 °C)] 99.2 °F (37.3 °C)  Pulse:  [105-120] 109  Resp:  [0-25] 18  SpO2:  [90 %-100 %] 98 %  BP: (117-156)/(62-87) 156/70     Weight: 120.2 kg (265 lb 0.9 oz)  Height: 6' (182.9 cm)  Body mass index is 35.95 kg/m².      Intake/Output Summary (Last 24 hours) at 02/05/18 0710  Last data filed at 02/05/18 0400   Gross per 24 hour   Intake                0 ml   Output             2200 ml   Net            -2200 ml       Ortho/SPM Exam     Gen:  No acute distress  Head:  Normocephalic.  Atraumatic.  Neuro:  Intact  CV:  Peripherally well-perfused.  Pulses 2+ bilaterally.  Lungs:  Normal respiratory effort.  Abdomen:  Soft, non-tender, non-distended  Extremities:  No cyanosis, clubbing, or edema.  Incision  c/d/i.  L2-S1 SILT  Chronic weakness ankle dorislfexion/plantarflexion

## 2018-02-05 NOTE — ASSESSMENT & PLAN NOTE
POD 4 S/P L2-PELVIS revision posterior spinal fusion    Pain control: oxycodone, acetaminophen, dilaudid for breakthrough  PT/OT: WBAT, ambulate with PT  DVT PPx: heparin 5000 q8h  Encourage IS use and will get respiratory txt to prevent postop pulm complication  Dispo: Advance diet, dc IVF, PT/OT. Discharge disposition pending.

## 2018-02-06 VITALS
SYSTOLIC BLOOD PRESSURE: 131 MMHG | OXYGEN SATURATION: 93 % | RESPIRATION RATE: 18 BRPM | HEART RATE: 109 BPM | DIASTOLIC BLOOD PRESSURE: 66 MMHG | BODY MASS INDEX: 35.9 KG/M2 | TEMPERATURE: 100 F | WEIGHT: 265.06 LBS | HEIGHT: 72 IN

## 2018-02-06 LAB
ANION GAP SERPL CALC-SCNC: 10 MMOL/L
BASOPHILS # BLD AUTO: 0.03 K/UL
BASOPHILS NFR BLD: 0.4 %
BUN SERPL-MCNC: 12 MG/DL
CALCIUM SERPL-MCNC: 8.9 MG/DL
CHLORIDE SERPL-SCNC: 102 MMOL/L
CO2 SERPL-SCNC: 24 MMOL/L
CREAT SERPL-MCNC: 0.8 MG/DL
DIFFERENTIAL METHOD: ABNORMAL
EOSINOPHIL # BLD AUTO: 0.1 K/UL
EOSINOPHIL NFR BLD: 1.9 %
ERYTHROCYTE [DISTWIDTH] IN BLOOD BY AUTOMATED COUNT: 12.7 %
EST. GFR  (AFRICAN AMERICAN): >60 ML/MIN/1.73 M^2
EST. GFR  (NON AFRICAN AMERICAN): >60 ML/MIN/1.73 M^2
GLUCOSE SERPL-MCNC: 101 MG/DL
HCT VFR BLD AUTO: 29.7 %
HGB BLD-MCNC: 10.1 G/DL
IMM GRANULOCYTES # BLD AUTO: 0.04 K/UL
IMM GRANULOCYTES NFR BLD AUTO: 0.6 %
LYMPHOCYTES # BLD AUTO: 1.3 K/UL
LYMPHOCYTES NFR BLD: 18.8 %
MCH RBC QN AUTO: 26.7 PG
MCHC RBC AUTO-ENTMCNC: 34 G/DL
MCV RBC AUTO: 79 FL
MONOCYTES # BLD AUTO: 0.7 K/UL
MONOCYTES NFR BLD: 10.2 %
NEUTROPHILS # BLD AUTO: 4.7 K/UL
NEUTROPHILS NFR BLD: 68.1 %
NRBC BLD-RTO: 0 /100 WBC
PLATELET # BLD AUTO: 241 K/UL
PMV BLD AUTO: 9.8 FL
POTASSIUM SERPL-SCNC: 4.1 MMOL/L
RBC # BLD AUTO: 3.78 M/UL
SODIUM SERPL-SCNC: 136 MMOL/L
WBC # BLD AUTO: 6.87 K/UL

## 2018-02-06 PROCEDURE — 36415 COLL VENOUS BLD VENIPUNCTURE: CPT

## 2018-02-06 PROCEDURE — 25000003 PHARM REV CODE 250: Performed by: STUDENT IN AN ORGANIZED HEALTH CARE EDUCATION/TRAINING PROGRAM

## 2018-02-06 PROCEDURE — 63600175 PHARM REV CODE 636 W HCPCS: Performed by: STUDENT IN AN ORGANIZED HEALTH CARE EDUCATION/TRAINING PROGRAM

## 2018-02-06 PROCEDURE — 85025 COMPLETE CBC W/AUTO DIFF WBC: CPT

## 2018-02-06 PROCEDURE — 80048 BASIC METABOLIC PNL TOTAL CA: CPT

## 2018-02-06 RX ADMIN — OXYCODONE HYDROCHLORIDE 15 MG: 5 TABLET ORAL at 12:02

## 2018-02-06 RX ADMIN — HEPARIN SODIUM 5000 UNITS: 5000 INJECTION, SOLUTION INTRAVENOUS; SUBCUTANEOUS at 05:02

## 2018-02-06 RX ADMIN — METHOCARBAMOL 750 MG: 750 TABLET ORAL at 05:02

## 2018-02-06 RX ADMIN — OXYCODONE HYDROCHLORIDE 15 MG: 5 TABLET ORAL at 03:02

## 2018-02-06 RX ADMIN — OXYCODONE HYDROCHLORIDE 15 MG: 5 TABLET ORAL at 08:02

## 2018-02-06 NOTE — PLAN OF CARE
Problem: Patient Care Overview  Goal: Plan of Care Review  Hx: Ruben Ataxia/Neuromuscular Scoliosis Sx: 4/21/16    Dx: L1 to Pelvis fusion revision ( L Iliac Screw loose from previous fusion in 2017)     2/1/18: L1-Pelvis fusion, Admit to SICU      MAP>65  SBP < 180   Outcome: Ongoing (interventions implemented as appropriate)  POC reviewed with patient and mother. AAOx4, VSS per patient trend. PRN Tylenol given x 1 for elevated Temp, PRN pain medication utilized around the clock. Patient turned q2 due to complaints of pain in back and hips, verbalizes pain relief with wedge utilization. No evidence of distress, call light within reach. Mother at bedside, will continue to monitor.

## 2018-02-06 NOTE — PROGRESS NOTES
[facility]  Orthopedics  Progress Note    Patient Name: Daren Dubon  MRN: [id number]  Admission Date: 2/1/2018  Hospital Length of Stay: 5 days  Attending Provider: Mart Rivera MD  Primary Care Provider: Luis Calderón MD  Follow-up For: Procedure(s) (LRB):  FUSION-SPINAL L1-Pelvis Revision Co Case with Dr. Banda Depuy Screws + T-PAL SNS: SSEP/EMG **AIRO Case** (N/A)    Post-Operative Day: 5 Days Post-Op  Subjective:     Principal Problem:Pseudoarthrosis of lumbar spine    Principal Orthopedic Problem: same    Interval History:    No acute events.  Pain controlled. Denies new numbness or tingling. Febrile yesterday to [address]. Pt denies fever, chills, NS.    Review of patient's allergies indicates:  No Known Allergies    Current Facility-Administered Medications   Medication    acetaminophen tablet 650 mg    aluminum-magnesium hydroxide-simethicone 200-200-20 mg/5 mL suspension 30 mL    bisacodyl suppository 10 mg    diphenhydrAMINE capsule 25 mg    heparin (porcine) injection 5,000 Units    methocarbamol tablet 750 mg    mupirocin 2 % ointment 1 g    ondansetron disintegrating tablet 8 mg    ondansetron injection 4 mg    oxyCODONE immediate release tablet 10 mg    oxyCODONE immediate release tablet 15 mg    promethazine (PHENERGAN) 6.25 mg in dextrose 5 % 50 mL IVPB    ramelteon tablet 8 mg    senna-docusate 8.6-50 mg per tablet 2 tablet     Objective:     Vital Signs (Most Recent):  Temp: 99.2 °F (37.3 °C) (02/06/18 0808)  Pulse: 105 (02/06/18 0808)  Resp: 18 (02/06/18 0808)  BP: 135/87 (02/06/18 0808)  SpO2: 95 % (02/06/18 0808) Vital Signs (24h Range):  Temp:  [99.1 °F (37.3 °C)-100.8 °F (38.2 °C)] 99.2 °F (37.3 °C)  Pulse:  [102-123] 105  Resp:  [16-18] 18  SpO2:  [92 %-98 %] 95 %  BP: (118-138)/(57-87) 135/87     Weight: 120.2 kg (265 lb 0.9 oz)  Height: 6' (182.9 cm)  Body mass index is 35.95 kg/m².      Intake/Output Summary (Last 24 hours) at 02/06/18 0820  Last  data filed at 02/05/18 1828   Gross per 24 hour   Intake                0 ml   Output              750 ml   Net             -750 ml       Ortho/SPM Exam     Gen:  No acute distress  Head:  Normocephalic.  Atraumatic.  Neuro:  Intact  CV:  Peripherally well-perfused.  Pulses 2+ bilaterally.  Lungs:  Normal respiratory effort.  Abdomen:  Soft, non-tender, non-distended  Extremities:  No cyanosis, clubbing, or edema.  Incision c/d/i. - no signs of infection  L2-S1 SILT  Chronic weakness ankle dorislfexion/plantarflexion      Assessment/Plan:     Neuromuscular scoliosis    POD 5 S/P L2-PELVIS revision posterior spinal fusion    Pain control: oxycodone, acetaminophen, dilaudid for breakthrough  PT/OT: WBAT, ambulate with PT  DVT PPx: heparin 5000 q8h  Encourage IS use and will get respiratory txt to prevent postop pulm complication  Dispo: Plan for discharge this afternoon                  Riky Jasso MD  Orthopedics  [facility]

## 2018-02-06 NOTE — ASSESSMENT & PLAN NOTE
POD 5 S/P L2-PELVIS revision posterior spinal fusion     Per Ortho  Pain control: oxycodone, acetaminophen, dilaudid for breakthrough  PT/OT: WBAT, ambulate with PT  DVT PPx: heparin 5000 q8h  Encourage IS use and will get respiratory txt to prevent postop pulm complication  Dispo: Plan for discharge this afternoon

## 2018-02-06 NOTE — SUBJECTIVE & OBJECTIVE
Principal Problem:Pseudoarthrosis of lumbar spine    Principal Orthopedic Problem: same    Interval History:    No acute events.  Pain controlled. Denies new numbness or tingling. Febrile yesterday to [address]. Pt denies fever, chills, NS.    Review of patient's allergies indicates:  No Known Allergies    Current Facility-Administered Medications   Medication    acetaminophen tablet 650 mg    aluminum-magnesium hydroxide-simethicone 200-200-20 mg/5 mL suspension 30 mL    bisacodyl suppository 10 mg    diphenhydrAMINE capsule 25 mg    heparin (porcine) injection 5,000 Units    methocarbamol tablet 750 mg    mupirocin 2 % ointment 1 g    ondansetron disintegrating tablet 8 mg    ondansetron injection 4 mg    oxyCODONE immediate release tablet 10 mg    oxyCODONE immediate release tablet 15 mg    promethazine (PHENERGAN) 6.25 mg in dextrose 5 % 50 mL IVPB    ramelteon tablet 8 mg    senna-docusate 8.6-50 mg per tablet 2 tablet     Objective:     Vital Signs (Most Recent):  Temp: 99.2 °F (37.3 °C) (02/06/18 0808)  Pulse: 105 (02/06/18 0808)  Resp: 18 (02/06/18 0808)  BP: 135/87 (02/06/18 0808)  SpO2: 95 % (02/06/18 0808) Vital Signs (24h Range):  Temp:  [99.1 °F (37.3 °C)-100.8 °F (38.2 °C)] 99.2 °F (37.3 °C)  Pulse:  [102-123] 105  Resp:  [16-18] 18  SpO2:  [92 %-98 %] 95 %  BP: (118-138)/(57-87) 135/87     Weight: 120.2 kg (265 lb 0.9 oz)  Height: 6' (182.9 cm)  Body mass index is 35.95 kg/m².      Intake/Output Summary (Last 24 hours) at 02/06/18 0820  Last data filed at 02/05/18 1828   Gross per 24 hour   Intake                0 ml   Output              750 ml   Net             -750 ml       Ortho/SPM Exam     Gen:  No acute distress  Head:  Normocephalic.  Atraumatic.  Neuro:  Intact  CV:  Peripherally well-perfused.  Pulses 2+ bilaterally.  Lungs:  Normal respiratory effort.  Abdomen:  Soft, non-tender, non-distended  Extremities:  No cyanosis, clubbing, or edema.  Incision c/d/i. - no signs of  infection  L2-S1 SILT  Chronic weakness ankle dorislfexion/plantarflexion

## 2018-02-06 NOTE — NURSING
Discharge packet reviewed with patient and family. Patient and family both verbalized understanding of discharge information, including medication administration, follow-up appointments, and wound care. Patient wheeled off unit via wheelchair to [facility]

## 2018-02-06 NOTE — PLAN OF CARE
POD 6 s/p L1-pelvis revision. PT/OT recommended home with family support. Patient verbalized understanding of today's discharge plan. SW and CM will continue to follow for any additional needs. Discharge and follow-up instructions to be completed by the bedside nurse.    No future appointments.     02/06/18 7491   Final Note   Assessment Type Final Discharge Note   Discharge Disposition Home   What phone number can be called within the next 1-3 days to see how you are doing after discharge? ([phone])   Hospital Follow Up  Appt(s) scheduled? Yes   Discharge plans and expectations educations in teach back method with documentation complete? Yes

## 2018-02-06 NOTE — DISCHARGE SUMMARY
[facility]  Orthopedics  Discharge Summary      Patient Name: Daren Dubon  MRN: [id number]  Admission Date: 2/1/2018  Hospital Length of Stay: 5 days  Discharge Date and Time: 2/6/2018  1:33 PM  Attending Physician: No att. providers found   Discharging Provider: Esthre Simmons MD  Primary Care Provider: Luis Calderón MD    HPI:   No notes on file    Procedure(s) (LRB):  FUSION-SPINAL L1-Pelvis Revision Co Case with Dr. Solo Ball Screws + T-PAL SNS: SSEP/EMG **AIRO Case** (N/A)      Hospital Course:  On 2/1/18, the patient arrived to the [facility] on the second floor of [facility] for proper pre-operative management.  Upon completion of pre-operative preparation, the patient was taken back to the operative theatre.  A  L1-Pelvis Revision PSF was performed without complication and the patient was transported to the post anesthesia care unit in stable condition.  After appropriate recovery from the anaesthetic agents used during the surgery, the patient was then transported to the hospital inpatient floor.  The interim of the hospital stay from arrival on the floor up to discharge has been uncomplicated, please see progress notes for daily details of the patient's inpatient stay.     Price: yes    Drain: yes    Pain Management:     - Regional Anesthetics: nil   - Pain Control Issues during IP Stay: Nil    Physical Therapy: Patient progress through expected milestones and currently meets discharge expectations for recommended discharge disposition.        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    Specialties:  Orthopedic Surgery, Spine Surgery  Why:  For wound re-check  Contact information:  [address]  [facility] [address]  [phone]                 Patient Instructions:     TRANSFER TUB BENCH FOR HOME USE   Order Specific Question Answer Comments   Type of Transfer Tub Bench: Padded    Height: 6' (1.829 m)    Weight: 120.2 kg (265 lb)    Length of need (1-99 months): 99    Vendor: Other (use comments)    Expected Date of Delivery: 2/2/2018 refused     COMMODE FOR HOME USE   Order Specific Question Answer Comments   Type: Standard    Height: 6' (1.829 m)    Weight: 120.2 kg (265 lb)    Length of need (1-99 months): 99    Vendor: Other (use comments)    Expected Date of Delivery: 2/2/2018 refused     Diet Adult Regular       Medications:  Reconciled Home Medications:   Discharge Medication List as of 2/6/2018 12:39 PM      START taking these medications    Details   docusate sodium (COLACE) 100 MG capsule Take 1 capsule (100 mg total) by mouth 2 (two) times daily. Available OTC as well, Starting u 2/1/2018, Print      methocarbamol (ROBAXIN) 750 MG Tab Take 1 tablet (750 mg total) by mouth 3 (three) times daily., Starting u 2/1/2018, Until Sun 2/11/2018, Print      ondansetron (ZOFRAN) 8 MG tablet Take 1 tablet (8 mg total) by mouth every 12 (twelve) hours as needed for Nausea., Starting u 2/1/2018, Print      oxyCODONE-acetaminophen (PERCOCET)  mg per tablet Take 1 tablet by mouth every 4 (four) hours as needed for  Pain., Starting Thu 2/1/2018, Print         STOP taking these medications       traMADol (ULTRAM) 50 mg tablet Comments:   Reason for Stopping:               Riky Jasso MD  Orthopedics  [facility]

## 2018-02-06 NOTE — SUBJECTIVE & OBJECTIVE
Past Medical History:   Diagnosis Date    Friedreich ataxia     Scoliosis        Past Surgical History:   Procedure Laterality Date    SCOLIOSIS REPAIR      wisdom teeth removal      age 18 years       Review of patient's allergies indicates:  No Known Allergies    No current facility-administered medications on file prior to encounter.      No current outpatient prescriptions on file prior to encounter.     Family History     Problem Relation (Age of Onset)    Heart disease Maternal Grandfather (43)    Hypertension Mother    No Known Problems Father, Brother        Social History Main Topics    Smoking status: Former Smoker    Smokeless tobacco: Never Used    Alcohol use No    Drug use: No    Sexual activity: Not on file     Review of Systems   Constitutional: Negative for fatigue and fever.   HENT: Negative for congestion and rhinorrhea.    Eyes: Negative for pain.   Respiratory: Negative for cough and shortness of breath.    Cardiovascular: Negative for chest pain and palpitations.   Gastrointestinal: Negative for abdominal pain and nausea.   Genitourinary: Negative for difficulty urinating and dysuria.   Musculoskeletal: Negative for arthralgias and back pain.   Skin: Negative for color change and pallor.   Neurological: Negative for dizziness and headaches.   Hematological: Negative for adenopathy. Does not bruise/bleed easily.   Psychiatric/Behavioral: Negative for agitation. The patient is not nervous/anxious.      Objective:     Vital Signs (Most Recent):  Temp: 99.2 °F (37.3 °C) (02/06/18 0808)  Pulse: (!) 115 (02/06/18 1043)  Resp: 18 (02/06/18 0808)  BP: 135/87 (02/06/18 0808)  SpO2: 95 % (02/06/18 0808) Vital Signs (24h Range):  Temp:  [99.1 °F (37.3 °C)-100.8 °F (38.2 °C)] 99.2 °F (37.3 °C)  Pulse:  [102-123] 115  Resp:  [16-18] 18  SpO2:  [92 %-97 %] 95 %  BP: (118-138)/(57-87) 135/87     Weight: 120.2 kg (265 lb 0.9 oz)  Body mass index is 35.95 kg/m².    Physical Exam   Constitutional: He is  oriented to person, place, and time. He appears well-developed and well-nourished. No distress.   HENT:   Head: Normocephalic and atraumatic.   Eyes: EOM are normal. Pupils are equal, round, and reactive to light.   Neck: Neck supple.   Cardiovascular: Normal rate and regular rhythm.    Pulmonary/Chest: Effort normal and breath sounds normal. No respiratory distress. He has no wheezes.   Abdominal: Soft. Bowel sounds are normal.   Musculoskeletal: Normal range of motion. He exhibits no edema.   Neurological: He is alert and oriented to person, place, and time.   Mm strength 3/5 b/l LE   Skin: Skin is warm and dry. He is not diaphoretic.   Psychiatric: He has a normal mood and affect. His behavior is normal.   Vitals reviewed.      Significant Labs:   CBC:   Recent Labs  Lab 02/05/18 0427 02/06/18  0459   WBC 7.78 6.87   HGB 10.2* 10.1*   HCT 30.7* 29.7*    241     CMP:   Recent Labs  Lab 02/05/18 0427 02/06/18  0459    136   K 3.9 4.1    102   CO2 26 24   GLU 86 101   BUN 11 12   CREATININE 0.9 0.8   CALCIUM 8.8 8.9   ANIONGAP 10 10   EGFRNONAA >60.0 >60.0       Significant Imaging: I have reviewed all pertinent imaging results/findings within the past 24 hours.

## 2018-02-06 NOTE — ASSESSMENT & PLAN NOTE
- wheelchair bound since age 12-13  - complications of Friedreich's ataxia can include NM dysfunction, DM, cardiomyopathy  - 1/23/2018 2D echo: Normal left ventricular systolic function (EF 60-65%).  Normal left ventricular diastolic function.  Normal right ventricular systolic function . The estimated PA systolic pressure is 5 mmHg.   - hgb A1c 5.5

## 2018-02-06 NOTE — HPI
Mr. Daren Dubon, 23 yo male w/PMH of Neuromuscular scoliosis and Friedreich's ataxia, presents for L2-Pelvis revision posterior spinal fusion. He underwent T4-Pelvis PSF for Neuromuscular scoliosis on 4/21/2017. He was doing well after the spine surgery in April 2017 until he went to [place] and fell while coming back to the wheel chair after using the commode. He had been troubled with  severe  Rt hip pain since then. Pain increases with sitting for long time and activity and decreases with Tramadol.

## 2018-02-06 NOTE — ASSESSMENT & PLAN NOTE
POD 5 S/P L2-PELVIS revision posterior spinal fusion    Pain control: oxycodone, acetaminophen, dilaudid for breakthrough  PT/OT: WBAT, ambulate with PT  DVT PPx: heparin 5000 q8h  Encourage IS use and will get respiratory txt to prevent postop pulm complication  Dispo: Plan for discharge this afternoon

## 2018-02-06 NOTE — HOSPITAL COURSE
POD 5 s/p L2-pelvis revision posterior spinal fusion. Plan for D/C this PM. Medicine consulted for discharge clearance given Friedreich's ataxia.

## 2018-02-07 NOTE — OP NOTE
DATE OF PROCEDURE:  02/01/2018     SURGEON:  Mart Rivera M.D.     CO-SURGEON:  Javier Banda M.D.     PREOPERATIVE DIAGNOSIS:  Pseudoarthrosis, status post T2 to pelvis posterior   spinal fusion.     POSTOPERATIVE DIAGNOSIS:  Pseudoarthrosis, status post T2 to pelvis posterior   spinal fusion.     PROCEDURES PERFORMED:  1.  Revision posterior spinal fusion, L2 to pelvis.  2.  Removal and reinsertion of lumbar and posterior segmental instrumentation,   L2 to pelvis.  3.  Revision bilateral iliac fixation with S2 sacral ala or screws.  4.  Posterior lumbar interbody fusion, L5-S1.  5.  Application of titanium intervertebral spacer device To L5-S1 disk defect.  6.  Use of intraoperative CT-guided navigation.  7.  Local plus cadaveric bone grafting.     ANESTHESIA:  General endotracheal anesthesia.     ESTIMATED BLOOD LOSS:  300 mL     IMPLANTS:  DePuy Expedium and a size 11 titanium T-PAL cage.     SPECIMENS:  Hardware for gross pathology only.     SPONGE AND NEEDLE COUNT:  Correct x2.     NEUROLOGIC MONITORING:  Somatosensory evoked potentials and free-run EMG as well   as EMG stimulation of revised screws.  Please note, the left S1 nerve root,   stimulated at 2 milliamperes and the bilateral S1 screws and bilateral iliac   S2AI screws stimulated at greater than 20 milliamperes.     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  Unfortunately, the patient has developed a   pseudoarthrosis with broken hardware distally.  He has ongoing back pain and is   here for revision surgery.     DESCRIPTION OF INFORMED CONSENT:  I had a sit down discussion with the patient and his mother regarding the planned revision lumbar fusion. We specifically discussed the risks, benefits, and alternatives to surgery. We discussed the surgical procedure including the skin incision, nerve  decompression, bone fusion, allograft, iliac crest bone graft, and surgical implants including pedicle screws and interbody devices as indicated: they understand the risks include but are not limited to death, paralysis, blindness, bleeding, infection, damage to arteries, veins and nerves, spinal fluid leak, continued or worsening pain, no improvement in symptoms, non-union, and the possible need for more surgery in the future, the possible need for perioperative blood transfusion, as well as the possibility other unforseen and unknown complications. We talked about expected hospital stay and recovery period. All questions were answered; they understand and wish to proceed.         DESCRIPTION OF PROCEDURE:  The patient was met in the preoperative area where   his low back was marked as the operative site.  All final questions were   answered.  Sequential compression devices were placed in the patient's bilateral   calves and run throughout the case.  At this point, the patient was brought to   the Operating Room where general endotracheal anesthesia was induced.  Price   catheter was then inserted in stent sterile fashion.  The patient was then prone   onto a spinal navigation table.  The head was secured on the facial pillow.    All bony prominences were padded and personally checked by me, paying special   attention to the eyes, nose, mouth, axilla, elbows, hands, chest tips genitalia,   knees and feet.  Being satisfied with positioning and confirming it to be   adequate with Anesthesia, the patient's lower back was prepped and draped in   normal sterile fashion.     A full timeout was then called identifying the patient, the procedural site and   levels the availability of all instruments and implants and no specific nursing,   surgical, anesthetic or neurological monitoring concerns.  Finding that it was   safe to proceed with surgery, the patient was given weight appropriate dose of   Ancef by the Anesthesia  Service.     Next, we infiltrated the planned incision with 10 mL of 1% lidocaine with   epinephrine.     Next, Dr. Rivera and I made a midline lumbar incision and carried dissection   down through skin and subcutaneous tissues using combination of sharp dissection   and electrocautery where necessary.  The dorsal fascia was identified and   incised in midline and performed a preliminary revision subperiosteal exposure   of the patient's lumbar spine from L2 to the pelvis including exposure of the   hardware.  Significant metal debris was noted consistent with the patient's   pseudoarthrosis.  At this point, we removed set plugs from L2 all the way down   to the sacral bolts bilaterally.  The rods were then cut in situ just proximal   to the L3 pedicle screws.  The deirdre fragments were then removed.  The L3 pedicle   screws were removed and the pedicle screw holes were packed with Gelfoam.  We   then performed a revision exposure of all bony surfaces from L2 to the pelvis.    We then removed the bilateral sacral screws as well as bilateral iliac bolts in   a standard fashion.  The bilateral sacral tracts were then palpated and found to   be acceptable and screws were upsizing placed in the previously prepared   bilateral sacral screw sites.  At this point, I made a small incision over the   right PSIS and placed our navigation ray into the right pelvis.  We then   performed our navigation spin.  Using navigation assistance, we placed bilateral   S2 sacroiliac screws using in a standard fashion.  Pelvic inlet and outlet   x-rays were then taken, demonstrating adequate position of the screws.  The   wound was then thoroughly irrigated.  Next, we turned our attention to the   posterior lumbar interbody fusion.  An osteotome was used to remove the inferior   articular process of L5 bilaterally.  We then used a high-speed drill to thin   the L5 lamina to reveal the origin of the ligamentum flavum.  This was then    released from its origin, to reveal the blue white dura.  We then gently   expanded our decompression with a Kerrison punch.  A nerve root retractor was   then brought on the left side of thecal sac and use to protect it.  We then used   an osteotome to perform a superior facetectomy of S1 on the left.  We then   performed a subtotal L5-S1 diskectomy with a combination of straight and angled   curettes, disk ysabel as well as the pituitary rongeurs.  The disk space was   then thoroughly irrigated and sized to fit a size 11 spacer.  The spacer was   then packed with ViviGen bone graft.  The anterior aspect of this space was   packed with the patient's local morcellized bone graft and the cage was then   inserted in a standard sterile fashion.  AP and lateral radiographs were taken,   demonstrating correct level as well as adequate position of the cage.  Lateral   connectors and mary connectors were then used to incorporate rods into   patient's prior construct.  All implants were then final tightened with the    provided torque wrench.  The wound was then thoroughly irrigated,   including watches with Betadine infuse normal saline.  All bony surfaces from L2   and pelvis were decorticated with a high-speed drill.  The patient's local bone   graft mixed with the remainder of medium ViviGen in bone graft kit, as well as   1 g of vancomycin powder was laid dorsally from L2 to the pelvis.  A deep drain   was then placed.  The deep fascia was then closed with #1 Vicryl in an   interrupted figure-of-eight fashion.  Superficial layer was then irrigated and   closed over a drain with 2-0 Vicryl, 3-0 Monocryl, staples and a soft dressing.    The drains were secured in place with 2-0 silk sutures.  The patient was then   flipped supine, extubated and transferred to the Recovery Room in stable   condition.     JUSTIFICATION OF CO-SURGEON: This was a complex revision spinal deformity operation. The combined  efforts of two attending surgeons is indicated to decrease operative time, decrease blood loss, and improve outcomes.

## 2018-02-08 LAB
BACTERIA BLD CULT: NORMAL
BACTERIA BLD CULT: NORMAL

## 2018-02-08 NOTE — PROGRESS NOTES
Research Encounter  Nova [id number]  IRB# [id number]   Subject: [id number]  Date: 2/2/2018 - 2/6/2018  Visit 2, Day  After Surgery and Daily Monitoring Until Discharge     I reviewed the subjects electronic medical records daily for study specific medical information during his hospital stay until his day of discharge.   At the subjects day of discharge, a blood sample was collected and I collected nasal and oropharyngeal swabs as per the study protocol. I instructed the patient and his mother about the signs and symptoms of postoperative infection and issued her my contact information.

## 2018-02-09 LAB
BACTERIA BLD CULT: NORMAL
BACTERIA BLD CULT: NORMAL

## 2018-02-09 NOTE — PHYSICIAN QUERY
"PT Name: Daren Dubon  MR #: [id number]    Physician Query Form - Hematology Clarification      CDS/: Caroline Leahy               Contact information: [phone]    This form is a permanent document in the medical record.      Query Date: February 9, 2018    By submitting this query, we are merely seeking further clarification of documentation. Please utilize your independent clinical judgment when addressing the question(s) below.    The Medical record contains the following:   Indicators  Supporting Clinical Findings Location in Medical Record    "Anemia" documented      x H & H =  13.2 & 39.1---->12.5 & 37.1--->10.4 & 31.5     9.5 & 28.7---->10.2 & 30.7----->10.1 & 29.7  Lab Results 2/1 thru 2/6/2018    x BP =                     HR=  BP= (117-156) (62-87)      [id number]  Orthopedic Surgery Progress Note 2/5/2018    "GI bleeding" documented      Acute bleeding (Non GI site)      Transfusion(s)      Treatment:      x Other:  PROCEDURES PERFORMED:  1.  Revision posterior spinal fusion, L2 to pelvis.  2.  Removal and reinsertion of lumbar and posterior segmental instrumentation,   L2 to pelvis.  3.  Revision bilateral iliac fixation with S2 sacral ala or screws.  4.  Posterior lumbar interbody fusion, L5-S1.  5.  Application of titanium intervertebral spacer device To L5-S1 disk defect.  6.  Use of intraoperative CT-guided navigation.  7.  Local plus cadaveric bone grafting.    ESTIMATED BLOOD LOSS:  300 mL       Op Note 2/7/2018     Provider, please specify diagnosis or diagnoses associated with above clinical findings.    [ x ] Acute blood loss anemia expected post-operatively  [  ] Acute blood loss anemia  [  ] Iron deficiency anemia  [  ] Other Hematological Diagnosis (please specify): _________________________________    [  ] Clinically Undetermined       Please document in your progress notes daily for the duration of treatment, until resolved, and include in your discharge summary.                    "

## 2018-02-23 ENCOUNTER — RESEARCH ENCOUNTER (OUTPATIENT)
Dept: RESEARCH | Facility: HOSPITAL | Age: 23
End: 2018-02-23

## 2018-02-23 ENCOUNTER — OFFICE VISIT (OUTPATIENT)
Dept: ORTHOPEDICS | Facility: CLINIC | Age: 23
End: 2018-02-23
Payer: MEDICARE

## 2018-02-23 VITALS — WEIGHT: 265 LBS | BODY MASS INDEX: 35.89 KG/M2 | HEIGHT: 72 IN

## 2018-02-23 DIAGNOSIS — S32.009K PSEUDOARTHROSIS OF LUMBAR SPINE: Primary | ICD-10-CM

## 2018-02-23 DIAGNOSIS — Z98.1 S/P SPINAL FUSION: ICD-10-CM

## 2018-02-23 PROCEDURE — 99999 PR PBB SHADOW E&M-EST. PATIENT-LVL II: CPT | Mod: PBBFAC,,, | Performed by: PHYSICIAN ASSISTANT

## 2018-02-23 PROCEDURE — 99024 POSTOP FOLLOW-UP VISIT: CPT | Mod: POP,,, | Performed by: PHYSICIAN ASSISTANT

## 2018-02-23 PROCEDURE — 99212 OFFICE O/P EST SF 10 MIN: CPT | Mod: PBBFAC | Performed by: PHYSICIAN ASSISTANT

## 2018-02-23 RX ORDER — METHOCARBAMOL 500 MG/1
1000 TABLET, FILM COATED ORAL 4 TIMES DAILY
Qty: 80 TABLET | Refills: 0 | Status: SHIPPED | OUTPATIENT
Start: 2018-02-23 | End: 2018-03-05

## 2018-02-23 RX ORDER — OXYCODONE AND ACETAMINOPHEN 10; 325 MG/1; MG/1
1 TABLET ORAL EVERY 4 HOURS PRN
Qty: 91 TABLET | Refills: 0 | Status: SHIPPED | OUTPATIENT
Start: 2018-02-23 | End: 2018-03-14 | Stop reason: SDUPTHER

## 2018-02-23 NOTE — PROGRESS NOTES
Research Encounter  [facility] [id number]  IRB# [id number]   Subject: [id number]  Date: 2/23/2018  Visit 3      Daren was scheduled for an appointment in clinic today, so an in person visit was conducted in lieu of the scheduled phone call. Daren and his mother,Patricia, were present for the conversation. I have reviewed the subjects medical record and confirmed with the patient and his mother that no adverse events,protocol defined infections or visits to healthcare facilities have occurred. The patients medications were discussed and confirmed in the medical record. I advised the patient and his mother that I will call next week to schedule a research appointment for Visit 4.

## 2018-02-23 NOTE — PROGRESS NOTES
Date: 02/23/2018    Supervising Physician: Mart Rivera M.D.    Date of Surgery: 2/1/2018    Procedure: Revision posterior spinal fusion L2 - Pelvis for pseudoarthrosis s/p T2-pelvis PSF for neuromuscular scoliosis associated with Frederich's Ataxia    History: Daren Dubon is seen today for follow-up following the above listed procedure. Overall the patient is doing well but today notes he has having trouble sleeping at night.   Pain is well controlled with current pain medication.  he denies fever, chills, and sweats since the time of the surgery.       Exam: Post op dressing taken down.  Staples in place.  Incision is healing well, clean, dry and intact.   There is no sign of infection. Neuro exam is stable. No signs of DVT.    Radiographs: no new imaging today    Assessment/Plan: 3 weeks post op.    Doing well postoperatively. Staples removed today.  Refill percocet given today.  Will increase robaxin to 1000mg at night.    I will plan to see the patient back for the next postop visit at the scoli clinic 4/6.     Thank you for the opportunity to participate in this patient's care. Please give me a call if there are any concerns or questions.

## 2018-03-06 ENCOUNTER — TELEPHONE (OUTPATIENT)
Dept: RESEARCH | Facility: HOSPITAL | Age: 23
End: 2018-03-06

## 2018-03-09 RX ORDER — TRAMADOL HYDROCHLORIDE 50 MG/1
50 TABLET ORAL EVERY 6 HOURS PRN
Qty: 60 TABLET | Refills: 0 | OUTPATIENT
Start: 2018-03-09 | End: 2018-03-19

## 2018-03-14 PROBLEM — E66.9 OBESITY (BMI 30-39.9): Status: ACTIVE | Noted: 2018-03-14

## 2018-03-14 PROBLEM — R50.9 LOW GRADE FEVER: Status: RESOLVED | Noted: 2018-01-22 | Resolved: 2018-03-14

## 2018-03-14 PROBLEM — Z99.3 WHEELCHAIR BOUND: Status: ACTIVE | Noted: 2018-03-14

## 2018-03-21 ENCOUNTER — RESEARCH ENCOUNTER (OUTPATIENT)
Dept: RESEARCH | Facility: HOSPITAL | Age: 23
End: 2018-03-21

## 2018-03-21 NOTE — PROGRESS NOTES
Research Encounter  Nova [id number]  IRB# [id number]   Subject: [id number]  Date:3/21/2018  Visit 4     Daren and his mother,Patricia, were present for the conversation. I have reviewed the subjects medical record and confirmed with the patient and his mother that no adverse events,protocol defined infections or visits to healthcare facilities for reasons related to his surgery have occurred. The patients medications were discussed and confirmed in the medical record. The patient had blood drawn and nose and throat swabs were taken by the CRC, Kiko Campbell.  I advised the patient and his mother that I will call next week to schedule a research appointment for Visit 5. The patient was reminded to monitor for any signs or symptoms of postoperative infection.

## 2018-05-04 ENCOUNTER — RESEARCH ENCOUNTER (OUTPATIENT)
Dept: RESEARCH | Facility: HOSPITAL | Age: 23
End: 2018-05-04

## 2018-05-04 ENCOUNTER — OFFICE VISIT (OUTPATIENT)
Dept: ORTHOPEDICS | Facility: CLINIC | Age: 23
End: 2018-05-04
Payer: MEDICARE

## 2018-05-04 VITALS — HEIGHT: 72 IN

## 2018-05-04 DIAGNOSIS — Z00.6 EXAMINATION OF PARTICIPANT IN CLINICAL TRIAL: ICD-10-CM

## 2018-05-04 DIAGNOSIS — M41.45 NEUROMUSCULAR SCOLIOSIS OF THORACOLUMBAR REGION: Primary | ICD-10-CM

## 2018-05-04 PROCEDURE — 99212 OFFICE O/P EST SF 10 MIN: CPT | Mod: PBBFAC | Performed by: ORTHOPAEDIC SURGERY

## 2018-05-04 PROCEDURE — 99212 OFFICE O/P EST SF 10 MIN: CPT | Mod: S$PBB,,, | Performed by: ORTHOPAEDIC SURGERY

## 2018-05-04 PROCEDURE — 99999 PR PBB SHADOW E&M-EST. PATIENT-LVL II: CPT | Mod: PBBFAC,,, | Performed by: ORTHOPAEDIC SURGERY

## 2018-05-04 RX ORDER — TIZANIDINE 2 MG/1
4 TABLET ORAL DAILY
COMMUNITY
End: 2019-09-25

## 2018-05-04 NOTE — PROGRESS NOTES
Date: 05/04/2018    Supervising Physician: Mart Rivera M.D.    Date of Surgery: 2/1/2018    Procedure: Revision posterior spinal fusion L2 - Pelvis for pseudoarthrosis s/p T2-pelvis PSF for neuromuscular scoliosis associated with Frederich's Ataxia    History: Daren Dubon is seen today for follow-up following the above listed procedure. Overall the patient is doing well but today he comes in with complaints of skin irritation on his back.  Pain is well controlled with current pain medication.  he denies fever, chills, and sweats since the time of the surgery.       Exam:  Incision is healed   There is no sign of infection. There is a diffuse acne present around the incision. Neuro exam is stable. No signs of DVT.    Radiographs: no new imaging today    Assessment/Plan: 3 months post op.    Doing well. Will refer to dermatology for evaluation of acne on back.    Thank you for the opportunity to participate in this patient's care. Please give me a call if there are any concerns or questions.

## 2018-05-04 NOTE — PROGRESS NOTES
Research Encounter  [facility] [id number]  IRB# [id number]   Subject: [id number]  Date:5/4/2018  Visit 5      I have reviewed the subjects medical record and confirmed with the patient that no adverse event orprotocol defined infections have occurred. The patient noted he has been to a pain management doctor. The patients medications were discussed and confirmed in the medical record. The patient had blood drawn and nose and throat swabs were taken by the CRC, Kiko Campbell.  The patient has his last appointment scheduled for August.The patient was reminded to monitor for any signs or symptoms of postoperative infection.

## 2018-08-08 ENCOUNTER — OFFICE VISIT (OUTPATIENT)
Dept: ORTHOPEDICS | Facility: CLINIC | Age: 23
End: 2018-08-08
Payer: MEDICARE

## 2018-08-08 ENCOUNTER — HOSPITAL ENCOUNTER (OUTPATIENT)
Dept: RADIOLOGY | Facility: HOSPITAL | Age: 23
Discharge: HOME | End: 2018-08-08
Attending: ORTHOPAEDIC SURGERY
Payer: MEDICARE

## 2018-08-08 ENCOUNTER — TELEPHONE (OUTPATIENT)
Dept: ORTHOPEDICS | Facility: CLINIC | Age: 23
End: 2018-08-08

## 2018-08-08 ENCOUNTER — RESEARCH ENCOUNTER (OUTPATIENT)
Dept: RESEARCH | Facility: HOSPITAL | Age: 23
End: 2018-08-08
Payer: MEDICARE

## 2018-08-08 VITALS — HEIGHT: 72 IN | BODY MASS INDEX: 35.76 KG/M2 | WEIGHT: 264 LBS

## 2018-08-08 DIAGNOSIS — Z00.6 EXAMINATION OF PARTICIPANT IN CLINICAL TRIAL: Primary | ICD-10-CM

## 2018-08-08 DIAGNOSIS — M41.9 SCOLIOSIS, UNSPECIFIED SCOLIOSIS TYPE, UNSPECIFIED SPINAL REGION: ICD-10-CM

## 2018-08-08 DIAGNOSIS — M25.512 LEFT SHOULDER PAIN, UNSPECIFIED CHRONICITY: Primary | ICD-10-CM

## 2018-08-08 DIAGNOSIS — M25.512 LEFT SHOULDER PAIN, UNSPECIFIED CHRONICITY: ICD-10-CM

## 2018-08-08 DIAGNOSIS — G11.11 FRIEDREICH'S ATAXIA: Primary | ICD-10-CM

## 2018-08-08 PROCEDURE — 73030 X-RAY EXAM OF SHOULDER: CPT | Mod: 26,LT,, | Performed by: RADIOLOGY

## 2018-08-08 PROCEDURE — 72082 X-RAY EXAM ENTIRE SPI 2/3 VW: CPT | Mod: TC

## 2018-08-08 PROCEDURE — 99212 OFFICE O/P EST SF 10 MIN: CPT | Mod: PBBFAC,25 | Performed by: ORTHOPAEDIC SURGERY

## 2018-08-08 PROCEDURE — 73030 X-RAY EXAM OF SHOULDER: CPT | Mod: TC,LT

## 2018-08-08 PROCEDURE — 72082 X-RAY EXAM ENTIRE SPI 2/3 VW: CPT | Mod: 26,,, | Performed by: RADIOLOGY

## 2018-08-08 PROCEDURE — 99213 OFFICE O/P EST LOW 20 MIN: CPT | Mod: S$PBB,,, | Performed by: ORTHOPAEDIC SURGERY

## 2018-08-08 PROCEDURE — 99999 PR PBB SHADOW E&M-EST. PATIENT-LVL II: CPT | Mod: PBBFAC,,, | Performed by: ORTHOPAEDIC SURGERY

## 2018-08-09 NOTE — PROGRESS NOTES
Research Encounter  [facility] [id number]  IRB# [id number]   Subject: [id number]  Date:8/8/2018  Visit 6      I have reviewed the subjects medical record and confirmed with the patient that no adverse event or protocol defined infections have occurred. The patients medications were discussed and confirmed in the medical record. The patient had blood drawn and nose and throat swabs were taken by the CRC, Kiko Campbell.  This was the patients last appointment for the Strive study. The patient was reminded to monitor for any signs or symptoms of postoperative infection.

## 2018-08-13 NOTE — PROGRESS NOTES
Date: 08/13/2018    Supervising Physician: Mart Rivera M.D.    Date of Surgery: 2/1/2018    Procedure: Revision posterior spinal fusion L2 - Pelvis for pseudoarthrosis s/p T2-pelvis PSF for neuromuscular scoliosis associated with Frederich's Ataxia    History: Daren Dubon is seen today for follow-up following the above listed procedure. Overall he is doing well. He had 2  recent falls and has been complaining of R shoulder pain.  He denies LBP    Exam:  Incision is healed   There is no sign of infection. There is a diffuse acne present around the incision. Neuro exam is stable. No signs of DVT. There is no pain with pROM of the R shoulder    Radiographs:2V spine and 3V R shoulder demonstrate no hardware complications, there are no shoulder fractures.    Assessment/Plan: 3 months post op.    Overall he is doing well. We will work on getting him a new wheelchair with a safety strap.

## 2019-01-15 ENCOUNTER — RESEARCH ENCOUNTER (OUTPATIENT)
Dept: RESEARCH | Facility: HOSPITAL | Age: 24
End: 2019-01-15

## 2019-01-15 ENCOUNTER — TELEPHONE (OUTPATIENT)
Dept: RESEARCH | Facility: HOSPITAL | Age: 24
End: 2019-01-15

## 2019-01-15 NOTE — PROGRESS NOTES
Contacted subject and subjects LAR to inform them of the 2018 futility results for Strive [id number] clinical trial and the subsequent end of the clinical trial. Subject and mother understood results and had no further questions. Subject and mother were told a letter will be sent to them via certified mail this week.

## 2019-05-19 ENCOUNTER — OFFICE VISIT (OUTPATIENT)
Dept: URGENT CARE | Facility: CLINIC | Age: 24
End: 2019-05-19
Payer: MEDICARE

## 2019-05-19 VITALS
BODY MASS INDEX: 35.49 KG/M2 | OXYGEN SATURATION: 97 % | SYSTOLIC BLOOD PRESSURE: 132 MMHG | HEART RATE: 88 BPM | HEIGHT: 72 IN | DIASTOLIC BLOOD PRESSURE: 75 MMHG | WEIGHT: 262 LBS | TEMPERATURE: 99 F

## 2019-05-19 DIAGNOSIS — S60.221A CONTUSION OF RIGHT HAND, INITIAL ENCOUNTER: Primary | ICD-10-CM

## 2019-05-19 DIAGNOSIS — S62.91XA CLOSED FRACTURE OF RIGHT HAND, INITIAL ENCOUNTER: ICD-10-CM

## 2019-05-19 PROCEDURE — 73130 XR HAND COMPLETE 3 VIEW RIGHT: ICD-10-PCS | Mod: RT,S$GLB,, | Performed by: RADIOLOGY

## 2019-05-19 PROCEDURE — 99204 OFFICE O/P NEW MOD 45 MIN: CPT | Mod: 25,S$GLB,, | Performed by: FAMILY MEDICINE

## 2019-05-19 PROCEDURE — 73130 X-RAY EXAM OF HAND: CPT | Mod: RT,S$GLB,, | Performed by: RADIOLOGY

## 2019-05-19 PROCEDURE — 29125 PR APPLY FOREARM SPLINT,STATIC: ICD-10-PCS | Mod: RT,S$GLB,, | Performed by: FAMILY MEDICINE

## 2019-05-19 PROCEDURE — 29125 APPL SHORT ARM SPLINT STATIC: CPT | Mod: RT,S$GLB,, | Performed by: FAMILY MEDICINE

## 2019-05-19 PROCEDURE — 99204 PR OFFICE/OUTPT VISIT, NEW, LEVL IV, 45-59 MIN: ICD-10-PCS | Mod: 25,S$GLB,, | Performed by: FAMILY MEDICINE

## 2019-05-19 RX ORDER — NAPROXEN 500 MG/1
500 TABLET ORAL 2 TIMES DAILY WITH MEALS
Qty: 20 TABLET | Refills: 0 | Status: SHIPPED | OUTPATIENT
Start: 2019-05-19 | End: 2019-06-24

## 2019-05-19 NOTE — PATIENT INSTRUCTIONS
Please drink plenty of fluids.  Please get plenty of rest.  Please return here or go to the Emergency Department for any concerns or worsening of condition.  If you were prescribed a narcotic medication, do not drive or operate heavy equipment or machinery while taking these medications.  If you were not prescribed an anti-inflammatory medication, and if you do not have any history of stomach/intestinal ulcers, or kidney disease, or are not taking a blood thinner such as Coumadin, Plavix, Pradaxa, Eloquis, or Xaralta for example, it is OK to take over the counter Ibuprofen or Advil or Motrin or Aleve as directed.  Do not take these medications on an empty stomach.  Rest, ice, compression and elevation to the affected joint or limb as needed.    If you were given a sling, wear it for comfort until follow up as arranged.  If you were given or placed in a splint, wear it until your follow up visit or recheck.  If you  smoke, please stop smoking.       Please follow up with your primary care doctor or specialist as needed.    Vero Burks MD  [phone]    Hand Surgery - [place]    Dr. Alejo CHAMBERS [address]  [address]  [place]  [address]  [phone]      Closed Hand Fracture (Adult)  You have a fracture, or broken bone, in your hand. This may be a small crack or chip in the bone. Or it may be a major break with the broken parts pushed out of place. A closed fracture means that the broken bone has not gone through the skin. A hand fracture is treated with a splint or cast. It usually takes 4 to 6 weeks to heal. Severe injuries may require surgery.     Home care  · Keep your arm elevated to reduce pain and swelling. When sitting or lying down, elevate your arm above the level of your heart. You can do this by placing your arm on a pillow that rests on your chest or on a pillow at your side. This is most important during the first 48 hours after injury.  · Apply an ice pack over the  injured area for no more than 15 to 20 minutes. Do this every 1 to 2 hours for the first 24 to 48 hours. Continue with ice packs as needed to ease pain and swelling. To make an ice pack, put ice cubes in a plastic bag that seals at the top. Wrap the bag in a clean, thin towel or cloth. Never put ice or an ice pack directly on the skin. You can place the ice pack inside the sling and directly over the cast or splint. As the ice melts, be careful that the cast or splint doesnt get wet.  · Keep the cast or splint completely dry at all times. Bathe with your cast or splint out of the water, protected with 2 large plastic bags. Place 1 bag outside the other. Tape each bag with duct tape at the top end. If a fiberglass cast or splint gets wet, dry it with a hair dryer on a cool setting.  · You may use over-the-counter pain medicine to control pain, unless another pain medicine was prescribed. If you have chronic liver or kidney disease or ever had a stomach ulcer or GI bleeding, talk with your provider beforeusing these medicines.  Follow-up care  Follow up with your healthcare provider within 1 week, or as advised. This is to be sure the bone is healing properly. If you were given a splint, it may be changed to a cast at your follow-up visit.  If X-rays were taken, you will be told of any new findings that may affect your care.  When to seek medical advice  Call your healthcare provider right away if any of these occur:  · The plaster cast or splint becomes wet or soft  · The fiberglass cast or splint stays wet for more than 24 hours  · The cast has a bad smell  · The plaster cast or splint becomes loose  · There is increased tightness or pain under the cast or splint  · The fingers on your injured hand become swollen, cold, blue, numb, or tingly  Date Last Reviewed: 12/3/2015  © 2949-7175 [employer]. [address] [place] [address], [place] [address]. All rights reserved. This information is not intended as a  substitute for professional medical care. Always follow your healthcare professional's instructions.

## 2019-05-19 NOTE — PROGRESS NOTES
Subjective:       Patient ID: Daren Dubon is a 24 y.o. male.    Vitals:  height is 6' (1.829 m) and weight is 118.8 kg (262 lb). His oral temperature is 98.8 °F (37.1 °C). His blood pressure is 132/75 and his pulse is 88. His oxygen saturation is 97%.     Chief Complaint: Hand Injury    Hand Injury    His dominant hand is their right hand. The incident occurred 12 to 24 hours ago. The incident occurred at home. The injury mechanism was a direct blow. The pain is present in the right hand. The quality of the pain is described as burning. The pain is at a severity of 9/10. The pain is severe. The pain has been constant since the incident. Associated symptoms include numbness (right forearm ) and tingling. Pertinent negatives include no chest pain or muscle weakness. Nothing aggravates the symptoms. He has tried nothing for the symptoms.       Constitution: Negative for chills, fatigue and fever.   HENT: Negative for congestion and sore throat.    Neck: Negative for painful lymph nodes.   Cardiovascular: Negative for chest pain and leg swelling.   Eyes: Negative for double vision and blurred vision.   Respiratory: Negative for cough and shortness of breath.    Gastrointestinal: Negative for nausea, vomiting and diarrhea.   Genitourinary: Negative for dysuria, frequency and urgency.   Musculoskeletal: Positive for pain (right hand ), joint swelling and muscle ache. Negative for joint pain and muscle cramps.   Skin: Positive for bruising. Negative for color change, pale and rash.   Allergic/Immunologic: Negative for seasonal allergies.   Neurological: Positive for numbness (right forearm ). Negative for dizziness, history of vertigo, light-headedness, passing out and headaches.   Hematologic/Lymphatic: Negative for swollen lymph nodes, easy bruising/bleeding and history of blood clots. Does not bruise/bleed easily.   Psychiatric/Behavioral: Negative for nervous/anxious, sleep disturbance and depression. The  patient is not nervous/anxious.        Objective:      Physical Exam   Constitutional: He is oriented to person, place, and time. He appears well-developed and well-nourished. He is cooperative.  Non-toxic appearance. He does not appear ill. No distress.   HENT:   Head: Normocephalic and atraumatic. Head is without abrasion, without contusion and without laceration.   Right Ear: Hearing, tympanic membrane, external ear and ear canal normal. No hemotympanum.   Left Ear: Hearing, tympanic membrane, external ear and ear canal normal. No hemotympanum.   Nose: Nose normal. No mucosal edema, rhinorrhea or nasal deformity. No epistaxis. Right sinus exhibits no maxillary sinus tenderness and no frontal sinus tenderness. Left sinus exhibits no maxillary sinus tenderness and no frontal sinus tenderness.   Mouth/Throat: Uvula is midline, oropharynx is clear and moist and mucous membranes are normal. No trismus in the jaw. Normal dentition. No uvula swelling. No posterior oropharyngeal erythema.   Eyes: Pupils are equal, round, and reactive to light. Conjunctivae, EOM and lids are normal. Right eye exhibits no discharge. Left eye exhibits no discharge. No scleral icterus.   Sclera clear bilat   Neck: Trachea normal, normal range of motion, full passive range of motion without pain and phonation normal. Neck supple. No spinous process tenderness and no muscular tenderness present. No neck rigidity. No tracheal deviation present.   Cardiovascular: Normal rate, regular rhythm, normal heart sounds, intact distal pulses and normal pulses.   Pulmonary/Chest: Effort normal and breath sounds normal. No respiratory distress.   Abdominal: Soft. Normal appearance and bowel sounds are normal. He exhibits no distension, no pulsatile midline mass and no mass. There is no tenderness.   Musculoskeletal: Normal range of motion. He exhibits no edema or deformity.        Right hand: He exhibits tenderness and swelling.         Hands:  Neurological: He is alert and oriented to person, place, and time. He has normal strength. No cranial nerve deficit or sensory deficit. He exhibits normal muscle tone. He displays no seizure activity. Coordination normal. GCS eye subscore is 4. GCS verbal subscore is 5. GCS motor subscore is 6.   Skin: Skin is warm, dry and intact. Capillary refill takes less than 2 seconds. No abrasion, no bruising, no burn, no ecchymosis and no laceration noted. He is not diaphoretic. No pallor.   Psychiatric: He has a normal mood and affect. His speech is normal and behavior is normal. Judgment and thought content normal. Cognition and memory are normal.   Nursing note and vitals reviewed.    Type of Interpretation: ED Physician (Independently Interpreted).  Radiology Procedure Done: Right Hand.  Interpretation: Fracture base proximal phalanx ring finger.        Assessment:       1. Contusion of right hand, initial encounter    2. Closed fracture of right hand, initial encounter        Plan:     Patient placed in sugar tong splint and arm sling, limb N/V stable after splinting.    Contusion of right hand, initial encounter  -     X-Ray Hand 3 view Right; Future; Expected date: 05/19/2019    Closed fracture of right hand, initial encounter  -     naproxen (NAPROSYN) 500 MG tablet; Take 1 tablet (500 mg total) by mouth 2 (two) times daily with meals.  Dispense: 20 tablet; Refill: 0  -     Ambulatory referral to Orthopedics  -     SLING FOR HOME USE    Please drink plenty of fluids.  Please get plenty of rest.  Please return here or go to the Emergency Department for any concerns or worsening of condition.  If you were prescribed a narcotic medication, do not drive or operate heavy equipment or machinery while taking these medications.  If you were not prescribed an anti-inflammatory medication, and if you do not have any history of stomach/intestinal ulcers, or kidney disease, or are not taking a blood thinner such as  Coumadin, Plavix, Pradaxa, Eloquis, or Xaralta for example, it is OK to take over the counter Ibuprofen or Advil or Motrin or Aleve as directed.  Do not take these medications on an empty stomach.  Rest, ice, compression and elevation to the affected joint or limb as needed.    If you were given a sling, wear it for comfort until follow up as arranged.  If you were given or placed in a splint, wear it until your follow up visit or recheck.  If you  smoke, please stop smoking.       Please follow up with your primary care doctor or specialist as needed.    Vero Burks MD  [phone]    Hand Surgery - [place] CH[facility] [address]  [address]  [place]  [id number] [phone]

## 2019-05-22 ENCOUNTER — TELEPHONE (OUTPATIENT)
Dept: URGENT CARE | Facility: CLINIC | Age: 24
End: 2019-05-22

## 2019-06-24 PROBLEM — R73.9 HYPERGLYCEMIA: Status: RESOLVED | Noted: 2018-01-22 | Resolved: 2019-06-24

## 2019-09-25 PROBLEM — F51.01 PRIMARY INSOMNIA: Status: ACTIVE | Noted: 2019-09-25

## 2023-04-13 ENCOUNTER — PATIENT MESSAGE (OUTPATIENT)
Dept: NEUROLOGY | Facility: CLINIC | Age: 28
End: 2023-04-13
Payer: MEDICARE

## 2024-09-05 ENCOUNTER — PATIENT MESSAGE (OUTPATIENT)
Dept: NEUROLOGY | Facility: CLINIC | Age: 29
End: 2024-09-05
Payer: MEDICARE

## (undated) DEVICE — GAUZE SPONGE 4X4 12PLY

## (undated) DEVICE — KIT EVACUATOR 3-SPRING 1/8 DRN

## (undated) DEVICE — SEE MEDLINE ITEM 146417

## (undated) DEVICE — SEE MEDLINE ITEM 156905

## (undated) DEVICE — CORD BIPOLAR 12 FOOT

## (undated) DEVICE — DIFFUSER

## (undated) DEVICE — APPLICATOR CHLORAPREP ORN 26ML

## (undated) DEVICE — TAP 8MM SPINAL POWER

## (undated) DEVICE — SUT VICRYL+ 1 CT1 18IN

## (undated) DEVICE — GOWN AERO CHROME W/ TOWEL XL

## (undated) DEVICE — SPONGE GAUZE 16PLY 4X4

## (undated) DEVICE — DRESSING TRANS 4X4 TEGADERM

## (undated) DEVICE — SEE MEDLINE ITEM 146347

## (undated) DEVICE — KIT SURGIFLO HEMOSTATIC MATRIX

## (undated) DEVICE — SPONGE LAP 4X18 PREWASHED

## (undated) DEVICE — MARKER SKIN STND TIP BLUE BARR

## (undated) DEVICE — SUT VICRYL PLUS 2-0 CT1 18

## (undated) DEVICE — SEALANT EVICEL FIBRIN HUMN 2ML

## (undated) DEVICE — NDL SPINAL 18GX3.5 SPINOCAN

## (undated) DEVICE — KIT SPINAL PATIENT CARE JACK

## (undated) DEVICE — DRESSING AQUACEL FOAM 3 X 3

## (undated) DEVICE — ELECTRODE REM PLYHSV RETURN 9

## (undated) DEVICE — WARMER DRAPE STERILE LF

## (undated) DEVICE — DRESSING MEPORE ADH 3.5X12

## (undated) DEVICE — NDL 20GX1-1/2IN IB

## (undated) DEVICE — SUT 2-0 SILK 30IN BLK BRAID

## (undated) DEVICE — BLADE 4IN EDGE INSULATED

## (undated) DEVICE — CLOSURE SKIN 1X5 STERI-STRIP

## (undated) DEVICE — Device

## (undated) DEVICE — DRESSING AQUACEL SACRAL 9 X 9

## (undated) DEVICE — DRESSING MEPILEX BORDER 4 X 4

## (undated) DEVICE — DRAPE C ARM 42 X 120 10/BX

## (undated) DEVICE — DRAPE ABDOMINAL TIBURON 14X11

## (undated) DEVICE — CARTRIDGE OIL

## (undated) DEVICE — SEE MEDLINE ITEM 157150

## (undated) DEVICE — DRAPE C-ARMOR EQUIPMENT COVER

## (undated) DEVICE — DRAPE STERI-DRAPE 1000 17X11IN

## (undated) DEVICE — SUTURE STRATAFIX PGA PCL 3-0

## (undated) DEVICE — TRAY FOLEY 16FR INFECTION CONT

## (undated) DEVICE — BUR BONE CUT MICRO TPS 3X3.8MM

## (undated) DEVICE — TAP POWER 6MM DOUBLE LEAD

## (undated) DEVICE — COVER BACK TABLE 72X21

## (undated) DEVICE — SYS PRINEO SKIN CLOSURE

## (undated) DEVICE — KIT SURGIFLO EVITHROM

## (undated) DEVICE — DRESSING AQUACEL FOAM 5 X 5

## (undated) DEVICE — DRESSING ADH ISLAND 3.6 X 14